# Patient Record
Sex: FEMALE | Race: WHITE | Employment: UNEMPLOYED | URBAN - NONMETROPOLITAN AREA
[De-identification: names, ages, dates, MRNs, and addresses within clinical notes are randomized per-mention and may not be internally consistent; named-entity substitution may affect disease eponyms.]

---

## 2017-07-26 ENCOUNTER — HOSPITAL ENCOUNTER (EMERGENCY)
Age: 22
Discharge: HOME | End: 2017-07-26
Payer: COMMERCIAL

## 2017-07-26 VITALS
WEIGHT: 240 LBS | HEIGHT: 67 IN | TEMPERATURE: 98.6 F | SYSTOLIC BLOOD PRESSURE: 149 MMHG | RESPIRATION RATE: 16 BRPM | HEART RATE: 84 BPM | BODY MASS INDEX: 37.67 KG/M2 | OXYGEN SATURATION: 99 % | DIASTOLIC BLOOD PRESSURE: 79 MMHG

## 2017-07-26 DIAGNOSIS — J02.9 ACUTE PHARYNGITIS, UNSPECIFIED ETIOLOGY: Primary | ICD-10-CM

## 2017-07-26 LAB
GROUP A STREP CULTURE, REFLEX: NEGATIVE
REFLEX THROAT C + S: NORMAL

## 2017-07-26 PROCEDURE — 99213 OFFICE O/P EST LOW 20 MIN: CPT | Performed by: NURSE PRACTITIONER

## 2017-07-26 PROCEDURE — 87880 STREP A ASSAY W/OPTIC: CPT

## 2017-07-26 PROCEDURE — 87070 CULTURE OTHR SPECIMN AEROBIC: CPT

## 2017-07-26 PROCEDURE — 99214 OFFICE O/P EST MOD 30 MIN: CPT

## 2017-07-26 RX ORDER — CETIRIZINE HYDROCHLORIDE 10 MG/1
10 TABLET ORAL DAILY
COMMUNITY
End: 2017-10-05 | Stop reason: HOSPADM

## 2017-07-26 ASSESSMENT — ENCOUNTER SYMPTOMS
COUGH: 1
STRIDOR: 0
TROUBLE SWALLOWING: 0
SINUS CONGESTION: 0
NAUSEA: 0
VOICE CHANGE: 0
EYE DISCHARGE: 0
SINUS PRESSURE: 0
CHEST TIGHTNESS: 0
SHORTNESS OF BREATH: 0
COLOR CHANGE: 0
VOMITING: 0
ABDOMINAL PAIN: 0
RHINORRHEA: 0
SORE THROAT: 1
WHEEZING: 0

## 2017-07-26 ASSESSMENT — PAIN SCALES - GENERAL: PAINLEVEL_OUTOF10: 4

## 2017-07-26 ASSESSMENT — PAIN DESCRIPTION - LOCATION: LOCATION: THROAT

## 2017-07-26 ASSESSMENT — PAIN DESCRIPTION - PAIN TYPE: TYPE: ACUTE PAIN

## 2017-07-26 ASSESSMENT — PAIN DESCRIPTION - DESCRIPTORS: DESCRIPTORS: SORE

## 2017-07-28 LAB — THROAT/NOSE CULTURE: NORMAL

## 2017-07-30 ENCOUNTER — HOSPITAL ENCOUNTER (EMERGENCY)
Age: 22
Discharge: HOME | End: 2017-07-30
Payer: COMMERCIAL

## 2017-07-30 VITALS
RESPIRATION RATE: 16 BRPM | HEIGHT: 67 IN | SYSTOLIC BLOOD PRESSURE: 137 MMHG | DIASTOLIC BLOOD PRESSURE: 93 MMHG | BODY MASS INDEX: 37.67 KG/M2 | HEART RATE: 89 BPM | WEIGHT: 240 LBS | OXYGEN SATURATION: 97 % | TEMPERATURE: 98.3 F

## 2017-07-30 DIAGNOSIS — J20.8 ACUTE VIRAL BRONCHITIS: Primary | ICD-10-CM

## 2017-07-30 PROCEDURE — 99213 OFFICE O/P EST LOW 20 MIN: CPT | Performed by: NURSE PRACTITIONER

## 2017-07-30 PROCEDURE — 99213 OFFICE O/P EST LOW 20 MIN: CPT

## 2017-07-30 RX ORDER — ALBUTEROL SULFATE 90 UG/1
2 AEROSOL, METERED RESPIRATORY (INHALATION) EVERY 4 HOURS PRN
Qty: 1 INHALER | Refills: 0 | Status: SHIPPED | OUTPATIENT
Start: 2017-07-30 | End: 2019-01-07 | Stop reason: SDUPTHER

## 2017-07-30 RX ORDER — GUAIFENESIN 400 MG/1
400 TABLET ORAL 4 TIMES DAILY PRN
COMMUNITY
End: 2017-10-26

## 2017-07-30 RX ORDER — IBUPROFEN 200 MG
200 TABLET ORAL EVERY 6 HOURS PRN
COMMUNITY
End: 2021-04-26 | Stop reason: ALTCHOICE

## 2017-07-30 RX ORDER — ACETAMINOPHEN 325 MG/1
650 TABLET ORAL EVERY 6 HOURS PRN
COMMUNITY
End: 2017-10-26

## 2017-07-30 RX ORDER — PREDNISONE 20 MG/1
20 TABLET ORAL 2 TIMES DAILY
Qty: 10 TABLET | Refills: 0 | Status: SHIPPED | OUTPATIENT
Start: 2017-07-30 | End: 2017-08-04

## 2017-07-30 ASSESSMENT — ENCOUNTER SYMPTOMS
TROUBLE SWALLOWING: 0
CHEST TIGHTNESS: 0
SHORTNESS OF BREATH: 0
RHINORRHEA: 1
WHEEZING: 0
SINUS PRESSURE: 0
COUGH: 1
SORE THROAT: 1

## 2017-07-30 ASSESSMENT — PAIN DESCRIPTION - DESCRIPTORS: DESCRIPTORS: ACHING

## 2017-07-30 ASSESSMENT — PAIN SCALES - GENERAL: PAINLEVEL_OUTOF10: 5

## 2017-07-30 ASSESSMENT — PAIN DESCRIPTION - LOCATION: LOCATION: CHEST

## 2017-07-30 ASSESSMENT — PAIN DESCRIPTION - PAIN TYPE: TYPE: ACUTE PAIN

## 2017-10-05 ENCOUNTER — HOSPITAL ENCOUNTER (EMERGENCY)
Age: 22
Discharge: HOME | End: 2017-10-05
Payer: COMMERCIAL

## 2017-10-05 VITALS
RESPIRATION RATE: 18 BRPM | HEART RATE: 72 BPM | BODY MASS INDEX: 38.06 KG/M2 | SYSTOLIC BLOOD PRESSURE: 158 MMHG | WEIGHT: 243 LBS | TEMPERATURE: 98.2 F | OXYGEN SATURATION: 99 % | DIASTOLIC BLOOD PRESSURE: 82 MMHG

## 2017-10-05 DIAGNOSIS — J30.2 SEASONAL ALLERGIC RHINITIS, UNSPECIFIED ALLERGIC RHINITIS TRIGGER: Primary | ICD-10-CM

## 2017-10-05 PROCEDURE — 99213 OFFICE O/P EST LOW 20 MIN: CPT | Performed by: NURSE PRACTITIONER

## 2017-10-05 PROCEDURE — 99212 OFFICE O/P EST SF 10 MIN: CPT

## 2017-10-05 RX ORDER — FLUTICASONE PROPIONATE 50 MCG
1 SPRAY, SUSPENSION (ML) NASAL DAILY
COMMUNITY
End: 2017-10-26 | Stop reason: SDUPTHER

## 2017-10-05 RX ORDER — LORATADINE 10 MG/1
10 TABLET ORAL DAILY
Qty: 15 TABLET | Refills: 0 | Status: SHIPPED | OUTPATIENT
Start: 2017-10-05 | End: 2017-10-26

## 2017-10-05 ASSESSMENT — ENCOUNTER SYMPTOMS
COUGH: 1
CHEST TIGHTNESS: 0
DIARRHEA: 0
SHORTNESS OF BREATH: 0
SORE THROAT: 0
VOMITING: 0
NAUSEA: 0
TROUBLE SWALLOWING: 0
ABDOMINAL PAIN: 0
RHINORRHEA: 0
SINUS PRESSURE: 0

## 2017-10-05 NOTE — ED AVS SNAPSHOT
After Visit Summary  (Discharge Instructions)    Medication List for Home    Based on the information you provided to us as well as any changes during this visit, the following is your updated medication list.  Compare this with your prescription bottles at home. If you have any questions or concerns, contact your primary care physician's office. Daily Medication List (This medication list can be shared with any Healthcare provider who is helping you manage your medications)      There are NEW medications for you. START taking them after you leave the hospital     loratadine 10 MG tablet   Commonly known as:  CLARITIN   Take 1 tablet by mouth daily         ASK your doctor about these medications if you have questions     acetaminophen 325 MG tablet   Commonly known as:  TYLENOL   Take 650 mg by mouth every 6 hours as needed for Pain       albuterol sulfate  (90 Base) MCG/ACT inhaler   Commonly known as:  VENTOLIN HFA   Inhale 2 puffs into the lungs every 4 hours as needed for Wheezing or Shortness of Breath       fluticasone 27.5 MCG/SPRAY nasal spray   Commonly known as:  VERAMYST   2 sprays by Nasal route daily       fluticasone 50 MCG/ACT nasal spray   Commonly known as:  FLONASE   1 spray by Nasal route daily       guaiFENesin 400 MG tablet   Take 400 mg by mouth 4 times daily as needed for Cough       ibuprofen 200 MG tablet   Commonly known as:  ADVIL;MOTRIN   Take 200 mg by mouth every 6 hours as needed for Pain       NASONEX NA   by Nasal route       simethicone 80 MG chewable tablet   Commonly known as:  MYLICON   Take 1 tablet by mouth every 6 hours as needed for Flatulence       Spacer/Aero-Holding [employer]   1 Device by Does not apply route daily as needed.          These are the medications you have told us you were taking at home, STOP taking them after you leave the hospital     cetirizine 10 MG tablet   Commonly known as:  ZYRTEC Where to Get Your Medications      You can get these medications from any pharmacy     Bring a paper prescription for each of these medications     loratadine 10 MG tablet               Allergies as of 10/5/2017     No Known Allergies      Immunizations as of 10/5/2017     No immunizations on file. After Visit Summary    This summary was created for you. Thank you for entrusting your care to us. The following information includes details about your hospital/visit stay along with steps you should take to help with your recovery once you leave the hospital.  In this packet, you will find information about the topics listed below:    · Instructions about your medications including a list of your home medications  · A summary of your hospital visit  · Follow-up appointments once you have left the hospital  · Your care plan at home      You may receive a survey regarding the care you received during your stay. Your input is valuable to us. We encourage you to complete and return your survey in the envelope provided. We hope you will choose us in the future for your healthcare needs. Patient Information     Patient Name JAIDEN Dela Cruz 1995      Care Provided at:     Name Address Phone       [address] [phone]            Your Visit    Here you will find information about your visit, including the reason for your visit. Please take this sheet with you when you visit your doctor or other health care provider in the future. It will help determine the best possible medical care for you at that time. If you have any questions once you leave the hospital, please call the department phone number listed below. Diagnoses this visit     Your diagnosis was SEASONAL ALLERGIC RHINITIS, UNSPECIFIED ALLERGIC RHINITIS TRIGGER.       Visit Information     Date of Visit Department Dept Phone [facility] [phone]      You were seen by     You were seen by Kristopher Lewis CNP. Follow-up Appointments    Below is a list of your follow-up and future appointments. This may not be a complete list as you may have made appointments directly with providers that we are not aware of or your providers may have made some for you. Please call your providers to confirm appointments. It is important to keep your appointments. Please bring your current insurance card, photo ID, co-pay, and all medication bottles to your appointment. If self-pay, payment is expected at the time of service. Follow-up Information     Follow up with Lai Manning MD.    Specialty:  Pediatrics    Why:  As needed, If symptoms worsen, GO DIRECTLY TO THE EMERGENCY DEPARTMENT    Contact information:    [place]   [address]          Follow up with [address] [facility]. Specialty:  Emergency Medicine    Why:  As needed, If symptoms worsen, GO DIRECTLY TO THE EMERGENCY DEPARTMENT    Contact information:    [address] [facility]  [phone]      Preventive Care        Date Due    Yearly Flu Vaccine (1) 9/1/2017                 Care Plan Once You Return Home    This section includes instructions you will need to follow once you leave the hospital.  Your care team will discuss these with you, so you and those caring for you know how to best care for your health needs at home. This section may also include educational information about certain health topics that may be of help to you. Important Information if you smoke or are exposed to smoking       SMOKING: QUIT SMOKING. THIS IS THE MOST IMPORTANT ACTION YOU CAN TAKE TO IMPROVE YOUR CURRENT AND FUTURE HEALTH. Call the [address] [place] [address] at [facility] NOW ([phone])    Smoking harms nonsmokers.  When nonsmokers are around people who smoke, they absorb nicotine, carbon monoxide, and other ingredients of tobacco smoke. DO NOT SMOKE AROUND CHILDREN     Children exposed to secondhand smoke are at an increased risk of:  Sudden Infant Death Syndrome (SIDS), acute respiratory infections, inflammation of the middle ear, and severe asthma. Over a longer time, it causes heart disease and lung cancer. There is no safe level of exposure to secondhand smoke. [employer] Signup     [employer] allows you to send messages to your doctor, view your test results, renew your prescriptions, schedule appointments, view visit notes, and more. How Do I Sign Up? 1. In your Internet browser, go to [email or web address]  2. Click on the Sign Up Now link in the Sign In box. You will see the New Member Sign Up page. 3. Enter your [employer] Access Code exactly as it appears below. You will not need to use this code after youve completed the sign-up process. If you do not sign up before the expiration date, you must request a new code. [employer] Access Code: [email or web address]  Expires: 12/4/2017  8:23 PM    4. Enter your Social Security Number ([id number]) and Date of Birth (mm/dd/yyyy) as indicated and click Submit. You will be taken to the next sign-up page. 5. Create a [employer] ID. This will be your [employer] login ID and cannot be changed, so think of one that is secure and easy to remember. 6. Create a [employer] password. You can change your password at any time. 7. Enter your Password Reset Question and Answer. This can be used at a later time if you forget your password. 8. Enter your e-mail address. You will receive e-mail notification when new information is available in [address] 9. Click Sign Up. You can now view your medical record. Additional Information  If you have questions, please contact the physician practice where you receive care. Remember, [employer] is NOT to be used for urgent needs.  For medical emergencies, dial 911. For questions regarding your MyChart account call [phone]. If you have a clinical question, please call your doctor's office. View your information online  ? Review your current list of  medications, immunization, and allergies. ? Review your future test results online . ? Review your discharge instructions provided by your caregivers at discharge    Certain functionality such as prescription refills, scheduling appointments or sending messages to your provider are not activated if your provider does not use [facility] in his/her office    For questions regarding your MyChart account call [phone]. If you have a clinical question, please call your doctor's office. The information on all pages of the After Visit Summary has been reviewed with me, the patient and/or responsible adult, by my health care provider(s). I had the opportunity to ask questions regarding this information. I understand I should dispose of my armband safely at home to protect my health information. A complete copy of the After Visit Summary has been given to me, the patient and/or responsible adult. Patient Signature/Responsible Adult: ___________________________________    Nurse Signature: ___________________________________  Resident/MLP Signature: ___________________________________  Attending Signature: ___________________________________    Date:____________Time:____________              Discharge Instructions            Allergies: Care Instructions  Your Care Instructions  Allergies occur when your body's defense system (immune system) overreacts to certain substances. The immune system treats a harmless substance as if it were a harmful germ or virus. Many things can cause this overreaction, including pollens, medicine, food, dust, animal dander, and mold. Allergies can be mild or severe.  Mild allergies can be managed with home ¨ Swelling. ¨ Belly pain, nausea, or vomiting. Watch closely for changes in your health, and be sure to contact your doctor if:  · You do not get better as expected. Where can you learn more? Go to [email or web address] and sign in to your [employer] account. Enter Y806 in the [employer] customer insight box to learn more about \"Allergies: Care Instructions. \"     If you do not have an account, please click on the \"Sign Up Now\" link. Current as of: April 3, 2017  Content Version: 11.3  © 0618-4901 [employer]. Care instructions adapted under license by [facility] ([facility]). If you have questions about a medical condition or this instruction, always ask your healthcare professional. [address] any warranty or liability for your use of this information.

## 2017-10-06 NOTE — ED TRIAGE NOTES
ambulatory back to exam room. Patient complains of cough, runny nose. Symptoms began 1 week ago. The cough is productive with yellow mucus. Respirations easy and unlabored. Condition stable. Waiting in room to be seen by medical provider.

## 2017-10-06 NOTE — ED PROVIDER NOTES
no frontal sinus tenderness. Left sinus exhibits no maxillary sinus tenderness and no frontal sinus tenderness. Mouth/Throat: Uvula is midline, oropharynx is clear and moist and mucous membranes are normal. No oral lesions. No uvula swelling. No oropharyngeal exudate, posterior oropharyngeal edema, posterior oropharyngeal erythema or tonsillar abscesses. Eyes: Conjunctivae are normal.   Neck: Normal range of motion and full passive range of motion without pain. Cardiovascular: Normal rate. Pulmonary/Chest: Effort normal. No accessory muscle usage. No respiratory distress. She has wheezes (a few scattered wheezes). Lymphadenopathy:        Head (right side): No submental, no submandibular, no tonsillar, no preauricular, no posterior auricular and no occipital adenopathy present. Head (left side): No submental, no submandibular, no tonsillar, no preauricular, no posterior auricular and no occipital adenopathy present. She has no cervical adenopathy. Right: Supraclavicular adenopathy present. Neurological: She is alert and oriented to person, place, and time. Skin: Skin is warm and dry. No rash (on exposed surfaces) noted. She is not diaphoretic. Psychiatric: She has a normal mood and affect. Her speech is normal.   Nursing note and vitals reviewed. DIAGNOSTIC RESULTS   Labs:No results found for this visit on 10/05/17. IMAGING:  No orders to display     URGENT CARE COURSE:     Vitals:    10/05/17 2002 10/05/17 2005   BP:  (!) 158/82   Pulse:  72   Resp:  18   Temp:  98.2 °F (36.8 °C)   TempSrc:  Temporal   SpO2:  99%   Weight: 243 lb (110.2 kg) 243 lb (110.2 kg)       Medications - No data to display  PROCEDURES:  None  FINAL IMPRESSION      1. Seasonal allergic rhinitis, unspecified allergic rhinitis trigger        DISPOSITION/PLAN   DISPOSITION Decision to Discharge   Medications as directed. No sign of infection, continue inhaler add Claritin.   Differential diagnosis(s)

## 2017-10-18 ENCOUNTER — TELEPHONE (OUTPATIENT)
Dept: FAMILY MEDICINE CLINIC | Age: 22
End: 2017-10-18

## 2017-10-18 NOTE — TELEPHONE ENCOUNTER
1. Appt time and date. (give directions)    10/26/17   2:00   Dr Saulo Mendes  Pt informed. 2.  Arrive 15 min before appt. Pt informed. 3. Please bring all medications to appt. Pt informed. 4. Bring immunization record. (if no record, which immunizations have you had and where?)  Pt informed.

## 2017-10-26 ENCOUNTER — TELEPHONE (OUTPATIENT)
Dept: FAMILY MEDICINE CLINIC | Age: 22
End: 2017-10-26

## 2017-10-26 ENCOUNTER — OFFICE VISIT (OUTPATIENT)
Dept: FAMILY MEDICINE CLINIC | Age: 22
End: 2017-10-26
Payer: COMMERCIAL

## 2017-10-26 VITALS
TEMPERATURE: 98.4 F | HEART RATE: 92 BPM | RESPIRATION RATE: 16 BRPM | HEIGHT: 66 IN | WEIGHT: 240.4 LBS | DIASTOLIC BLOOD PRESSURE: 80 MMHG | SYSTOLIC BLOOD PRESSURE: 118 MMHG | BODY MASS INDEX: 38.63 KG/M2

## 2017-10-26 DIAGNOSIS — Z23 NEED FOR INFLUENZA VACCINATION: ICD-10-CM

## 2017-10-26 DIAGNOSIS — J30.1 CHRONIC SEASONAL ALLERGIC RHINITIS DUE TO POLLEN: Primary | Chronic | ICD-10-CM

## 2017-10-26 DIAGNOSIS — E66.9 OBESITY (BMI 30-39.9): Chronic | ICD-10-CM

## 2017-10-26 DIAGNOSIS — J45.20 MILD INTERMITTENT ASTHMA WITHOUT COMPLICATION: Chronic | ICD-10-CM

## 2017-10-26 DIAGNOSIS — E28.2 PCOS (POLYCYSTIC OVARIAN SYNDROME): Chronic | ICD-10-CM

## 2017-10-26 PROCEDURE — 90688 IIV4 VACCINE SPLT 0.5 ML IM: CPT | Performed by: FAMILY MEDICINE

## 2017-10-26 PROCEDURE — G8484 FLU IMMUNIZE NO ADMIN: HCPCS | Performed by: FAMILY MEDICINE

## 2017-10-26 PROCEDURE — 90471 IMMUNIZATION ADMIN: CPT | Performed by: FAMILY MEDICINE

## 2017-10-26 PROCEDURE — G8417 CALC BMI ABV UP PARAM F/U: HCPCS | Performed by: FAMILY MEDICINE

## 2017-10-26 PROCEDURE — 99204 OFFICE O/P NEW MOD 45 MIN: CPT | Performed by: FAMILY MEDICINE

## 2017-10-26 PROCEDURE — G8427 DOCREV CUR MEDS BY ELIG CLIN: HCPCS | Performed by: FAMILY MEDICINE

## 2017-10-26 PROCEDURE — 1036F TOBACCO NON-USER: CPT | Performed by: FAMILY MEDICINE

## 2017-10-26 RX ORDER — CETIRIZINE HYDROCHLORIDE 10 MG/1
10 TABLET ORAL DAILY
Qty: 90 TABLET | Refills: 3 | Status: SHIPPED | OUTPATIENT
Start: 2017-10-26 | End: 2018-09-11 | Stop reason: ALTCHOICE

## 2017-10-26 RX ORDER — FLUTICASONE PROPIONATE 50 MCG
2 SPRAY, SUSPENSION (ML) NASAL DAILY
Qty: 3 BOTTLE | Refills: 3 | Status: SHIPPED | OUTPATIENT
Start: 2017-10-26 | End: 2018-12-06 | Stop reason: SDUPTHER

## 2017-10-26 NOTE — TELEPHONE ENCOUNTER
PFT study pre and post scheduled @ [address] [facility] [place]. 10/30/17 @ 0700 pt to arrive @ 0630   1. Light meal prior  2. No breathing meds 4-6 hr prior to test  3. Hold antihistamine 48 hr prior  4. No caffeine, smoking, alcohol day of test  5. No exercising day of test.  6. Cover mouth to prevent breathing cold air day of test.    Left detailed message on machine. Ok per signed hipaa.

## 2017-10-26 NOTE — PROGRESS NOTES
Chief Complaint   Patient presents with    New Patient     Establish care    Allergies    Asthma    Other       History obtained from the patient. SUBJECTIVE:  Reva Junior is a 23 y.o. female that presents today for establishing care with new physician, etc. [facility] patient[facility] [address]. 1.) YASH: long hx of allergies. Had been worse, but now on zytrec and flonase and very well controlled now. Tolerating well w/o side effects, no itchy eyes, runny nose or       2.) recurrent \"bronchitis\": states for years gets current bronchitis that requires an inhaler, albuiterol. Only needs when sick or allergies flare. When well, never uses. No sxs today. Never formally dx with asthma. ER several times for this. Feeling well the last few wks now.      3.) PCOS/Obesity: following with gyn for PCOS, used to be on ocp, but stopped. Did this because she wanted to see if her periods would regulate on their own. They have not. Periods regular while on OCP. She is asking what else can be done besides OCP.  She is exercising and is working on losing  wt      Age/Gender Health Maintenance    Lipid - age 30  DM Screen -   Lab Results   Component Value Date    GLUCOSE 86 04/15/2016    GLUCOSE 97 10/19/2011       Colon Cancer Screening - age 50  Lung Cancer Screening (Age 54 to [de-identified] with 27 pack year hx, current smoker or quit within past 15 years) - never smoekr    Tetanus - UTD 2009  Influenza Vaccine - UTD FALL 2017  Pneumonia Vaccine - discuss next visit  Zostavax - age 63     Breast Cancer Screening - age 44-55  Cervical Cancer Screening - age 24  Osteoporosis Screening - age 74    Falls screening - n/a      Current Outpatient Prescriptions   Medication Sig Dispense Refill    cetirizine (ZYRTEC ALLERGY) 10 MG tablet Take 1 tablet by mouth daily 90 tablet 3    fluticasone (FLONASE) 50 MCG/ACT nasal spray 2 sprays by Nasal route daily 3 Bottle 3    ibuprofen (ADVIL;MOTRIN) 200 MG tablet Take 200 mg by mouth every 6 hours as needed for Pain      albuterol sulfate HFA (VENTOLIN HFA) 108 (90 Base) MCG/ACT inhaler Inhale 2 puffs into the lungs every 4 hours as needed for Wheezing or Shortness of Breath 1 Inhaler 0    Spacer/Aero-Holding [employer] 1 Device by Does not apply route daily as needed. 1 Device 0     No current facility-administered medications for this visit. Orders Placed This Encounter   Medications    cetirizine (ZYRTEC ALLERGY) 10 MG tablet     Sig: Take 1 tablet by mouth daily     Dispense:  90 tablet     Refill:  3    fluticasone (FLONASE) 50 MCG/ACT nasal spray     Si sprays by Nasal route daily     Dispense:  3 Bottle     Refill:  3         All medications reviewed and reconciled, including OTC and herbal medications. Updated list given to patient. Patient Active Problem List    Diagnosis Date Noted    Allergic rhinitis     History of pneumonia     Mild intermittent asthma     PCOS (polycystic ovarian syndrome)     Obesity (BMI 30-39. 9)          Past Medical History:   Diagnosis Date    Allergic rhinitis     History of pneumonia     Mild intermittent asthma     Obesity (BMI 30-39. 9)     PCOS (polycystic ovarian syndrome)          Past Surgical History:   Procedure Laterality Date    TONSILLECTOMY      adnoids         No Known Allergies      Social History     Social History    Marital status: Single     Spouse name: N/A    Number of children: N/A    Years of education: N/A     Occupational History    Not on file.      Social History Main Topics    Smoking status: Never Smoker    Smokeless tobacco: Never Used    Alcohol use No    Drug use: No    Sexual activity: Not Currently     Other Topics Concern    Not on file     Social History Narrative    No narrative on file         Family History   Problem Relation Age of Onset    Diabetes Mother     High Blood Pressure Mother     High Blood Pressure Father     High Cholesterol Father     Colon Cancer Neg Hx  Breast Cancer Neg Hx          I have reviewed the patient's past medical history, past surgical history, allergies, medications, social and family history and I have made updates where appropriate. Review of Systems  Positive responses are highlighted in bold    Constitutional:  Fever, Chills, Night Sweats, Fatigue, Unexpected changes in weight  Eyes:  Eye discharge, Eye pain, Eye redness, Visual disturbances   HENT:  Ear pain, Tinnitus, Nosebleeds, Trouble swallowing, Hearing loss, Sore throat  Cardiovascular:  Chest Pain, Palpitations, Orthopnea, Paroxysmal Nocturnal Dyspnea  Respiratory:  Cough, Wheezing, Shortness of breath, Chest tightness, Apnea  Gastrointestinal:  Nausea, Vomiting, Diarrhea, Constipation, Heartburn, Blood in stool  Genitourinary:  Difficulty or painful urination, Flank pain, Change in frequency, Urgency  Skin:  Color change, Rash, Itching, Wound  Psychiatric:  Hallucinations, Anxiety, Depression, Suicidal ideation  Hematological:  Enlarged glands, Easy bleeding, Easily bruising  Musculoskeletal:  Joint pain, Back pain, Gait problems, Joint swelling, Myalgias  Neurological:  Dizziness, Headaches, Presyncope, Numbness, Seizures, Tremors  Allergy:  Environmental allergies, Food allergies  Endocrine:  Heat Intolerance, Cold Intolerance, Polydipsia, Polyphagia, Polyuria      PHYSICAL EXAM:  Vitals:    10/26/17 1410   BP: 118/80   Pulse: 92   Resp: 16   Temp: 98.4 °F (36.9 °C)   Weight: 240 lb 6.4 oz (109 kg)   Height: 5' 6\" (1.676 m)     Body mass index is 38.8 kg/m².   Pain Score:   0 - No pain    VS Reviewed  General Appearance: A&O x 3, No acute distress,well developed and well- nourished  Head: normocephalic and atraumatic  Eyes: pupils equal, round, and reactive to light, extraocular eye movements intact, conjunctivae and eye lids without erythema  ENT: external ear and ear canal clear bilaterally, TMs intact and regular, nose without deformity, nasal mucosa and turbinates normal without polyps, oropharynx normal, dentition is normal for age  Neck: supple and non-tender without mass, no thyromegaly or thyroid nodules, no cervical lymphadenopathy  Pulmonary/Chest: clear to auscultation bilaterally- no wheezes, rales or rhonchi, normal air movement, no respiratory distress or retractions  Cardiovascular: S1 and S2 auscultated w/ RRR. No murmurs, rubs, clicks, or gallops, distal pulses intact. Abdomen: soft, non-tender, non-distended, bowl sounds physiologic,  no rebound or guarding, no masses or hernias noted. Liver and spleen without enlargement. Extremities: no cyanosis, clubbing or edema of the lower extremities. +2 PT/DP bilaterally. Musculoskeletal: No joint swelling or gross deformity   Neuro:  Alert, 5/5 strength globally and symmetrically, 2+ patellar reflexes bilaterally  Psych: Affect appropriate. Mood normal. Thought process is normal without evidence of depression or psychosis. Good insight and appropriate interaction. Cognition and memory appear to be intact. Skin: warm and dry, no rash or erythema  Lymph:  No cervical, auricular or supraclavicular lymph nodes palpated        ASSESSMENT & PLAN  1. Chronic seasonal allergic rhinitis due to pollen    Well controlled at present  Con't zyrtec and flonase    - cetirizine (ZYRTEC ALLERGY) 10 MG tablet; Take 1 tablet by mouth daily  Dispense: 90 tablet; Refill: 3  - fluticasone (FLONASE) 50 MCG/ACT nasal spray; 2 sprays by Nasal route daily  Dispense: 3 Bottle; Refill: 3    2. Mild intermittent asthma without complication    Hx and exam most c/w mild intermittent dx    Reviewed this in detail today    Plan:  con't prn alb  Trigger control with allergic rhininis txt  Will get baseline PFTs  F/u 3 months    - FULL PFT STUDY WITH PRE AND POST; Future    3.  PCOS (polycystic ovarian syndrome)    Long discussion on pathophys of PCOS, how it's tied to weight and insulin resistant  Discussed the long-term consequences of anovulatory cycles and the rationale for treatment  con't weight loss and exercise  Would recommend resuming ocp and f/u with gyn as well, she plans to soon    4. Obesity (BMI 30-39.9)    con't with wt loss stategies, reassess in 3 months    5. Need for influenza vaccination    - INFLUENZA, QUADV, 3 YRS AND OLDER, IM, MDV, 0.5ML ([employer]idel[facility])      DISPOSITION    Return in about 3 months (around 1/26/2018) for f/u asthma, sooner as needed. Hieu Orellana released without restrictions. Future Appointments  Date Time Provider Stanislav Tellez   10/30/2017 7:00 AM STR PULMONARY FUNCTION ROOM 1 [facility] PFT None   1/26/2018 9:40 AM Jonnie Tang DO [facility]     PATIENT COUNSELING    Counseling was provided today regarding the following topics: Healthy eating habits, Regular exercise, substance abuse and healthy sleep habits. Hieu Orellana received counseling on the following healthy behaviors: nutrition, exercise and medication adherence    Patient given educational materials on: See Attached    I have instructed Hieu Orellana to complete a self tracking handout on Weights and instructed them to bring it with them to her next appointment. Barriers to learning and self management: none    Discussed use, benefit, and side effects of prescribed medications. Barriers to medication compliance addressed. All patient questions answered. Pt voiced understanding.        Electronically signed by Jonnie Tang DO on 10/26/2017 at 4:03 PM

## 2017-10-26 NOTE — PROGRESS NOTES
After obtaining consent, and per orders of Dr. Oneil Portillo, injection of Fluzone given in left deltoid by Erin Coop. Patient instructed to report any adverse reaction to me immediately. Most recent Vaccine Information Sheet dated 8/7/15 given to pt.

## 2017-10-26 NOTE — PROGRESS NOTES
Visit Information    Have you changed or started any medications since your last visit including any over-the-counter medicines, vitamins, or herbal medicines? no   Are you having any side effects from any of your medications? -  no  Have you stopped taking any of your medications? Is so, why? -  no    Have you seen any other physician or provider since your last visit? No  Have you had any other diagnostic tests since your last visit? No  Have you been seen in the emergency room and/or had an admission to a hospital since we last saw you? Yes - Records Obtained  Have you had your routine dental cleaning in the past 6 months? yes - 8/2017    Have you activated your iConclude account? If not, what are your barriers?  Yes     Patient Care Team:  Celso Godoy DO as PCP - General (Family Medicine)    Medical History Review  Past Medical, Family, and Social History reviewed and does contribute to the patient presenting condition    Health Maintenance   Topic Date Due    HIV screen  06/04/2012    Chlamydia screen  06/04/2013    Flu vaccine (1) 09/01/2017    DTaP/Tdap/Td vaccine (7 - Td) 09/27/2019    Meningococcal (MCV) Vaccine Age 0-22 Years  Completed

## 2017-10-26 NOTE — PATIENT INSTRUCTIONS
occur. Then you can take medicine to prevent the asthma attack or make it less severe. · See your doctor regularly. These visits will help you learn more about asthma and what you can do to control it. Your doctor will monitor your treatment to make sure the medicine is helping you. · Keep track of your asthma attacks and your treatment. After you have had an attack, write down what triggered it, what helped end it, and any concerns you have about your asthma action plan. Take your diary when you see your doctor. You can then review your asthma action plan and decide if it is working. · Do not smoke or allow others to smoke around you. Avoid smoky places. Smoking makes asthma worse. If you need help quitting, talk to your doctor about stop-smoking programs and medicines. These can increase your chances of quitting for good. · Learn what triggers an asthma attack for you, and avoid the triggers when you can. Common triggers include colds, smoke, air pollution, dust, pollen, mold, pets, cockroaches, stress, and cold air. · Avoid colds and the flu. Get a pneumococcal vaccine shot. If you have had one before, ask your doctor whether you need a second dose. Get a flu vaccine every fall. If you must be around people with colds or the flu, wash your hands often. When should you call for help? Call 911 anytime you think you may need emergency care. For example, call if:  · You have severe trouble breathing. Call your doctor now or seek immediate medical care if:  · Your symptoms do not get better after you have followed your asthma action plan. · You cough up yellow, dark brown, or bloody mucus (sputum). Watch closely for changes in your health, and be sure to contact your doctor if:  · Your coughing and wheezing get worse. · You need to use quick-relief medicine on more than 2 days a week (unless it is just for exercise). · You need help figuring out what is triggering your asthma attacks.   Where can you learn more?  Go to [email or web address]://[email or web address] org and sign in to your Climeworks account. Enter P597 in the [facility] box to learn more about \"Asthma in Adults: Care Instructions. \"     If you do not have an account, please click on the \"Sign Up Now\" link. Current as of: March 25, 2017  Content Version: 11.3  © 6209-1700 Diet4Life. Care instructions adapted under license by [facility] ([facility]). If you have questions about a medical condition or this instruction, always ask your healthcare professional. [place] [address] any warranty or liability for your use of this information. Patient Education        Learning About Asthma Triggers  What are asthma triggers? When you have asthma, certain things can make your symptoms worse. These are called triggers. Learn what triggers an asthma attack for you, and avoid the triggers when you can. Common triggers include colds, smoke, air pollution, dust, pollen, pets, stress, and cold air. How do asthma triggers affect you? Triggers can make it harder for your lungs to work as they should. They can lead to sudden breathing problems and other symptoms. When you are around a trigger, an asthma attack is more likely. If your symptoms are severe, you may need emergency treatment or have to go to the hospital for treatment. What can you do to avoid triggers? The first thing is to know your triggers. When you are having symptoms, note the things around you that might be causing them. Then look for patterns that may be triggering your symptoms. Record your triggers on a piece of paper or in an asthma diary. When you have your list of possible triggers, work with your doctor to find ways to avoid them. Avoid colds and flu. Get a pneumococcal vaccine shot. If you have had one before, ask your doctor whether you need a second dose. Get a flu vaccine every year, as soon as it's available.  If you must be around people with colds or the daughters have a higher chance of getting it too. You may have other symptoms. These include weight gain, acne, too much hair growth on the face or body, high blood pressure, and high blood sugar. Your ovaries may have cysts on them. These cysts are growths filled with fluid. Keep in mind that although you may not have regular periods, you can still get pregnant. Talk to your doctor about birth control if you do not want to get pregnant. Sometimes the hormone changes with PCOS can also make it hard for some women to get pregnant. If this is a concern, talk to your doctor about treatment for this problem. Women who have PCOS can go for months or longer with no period. Your doctor may recommend medicines that can help get your cycles back to normal.  Follow-up care is a key part of your treatment and safety. Be sure to make and go to all appointments, and call your doctor if you are having problems. It's also a good idea to know your test results and keep a list of the medicines you take. How can you care for yourself at home? · Take your medicines exactly as prescribed. Call your doctor if you think you are having a problem with your medicine. · Eat a healthy diet. Include fruits, vegetables, beans, and whole grains in your diet each day. · If you are overweight, losing weight can help with many of the symptoms of PCOS. Talk to your doctor about safe ways to lose weight. · Get at least 30 minutes of exercise on most days of the week. Walking is a good choice. Or you can run, swim, cycle, or play tennis or team sports. · For hair growth you don't want, try bleaching, plucking, electrolysis, or laser therapy. · Acne can be treated with over-the-counter medicines. Look for ones that have benzoyl peroxide or salicylic acid in them. When should you call for help? Call your doctor now or seek immediate medical care if:  · You have severe vaginal bleeding.  This means that you are soaking through your usual pads or tampons each hour for 2 or more hours. Watch closely for changes in your health, and be sure to contact your doctor if:  · You have more vaginal bleeding, or bleeding is more irregular. Where can you learn more? Go to https://chpeblaireb.[email or web address] org and sign in to your Magnasense account. Enter S822 in the ShopWell box to learn more about \"Polycystic Ovary Syndrome: Care Instructions. \"     If you do not have an account, please click on the \"Sign Up Now\" link. Current as of: October 13, 2016  Content Version: 11.3  © 0790-0228 Hactus, Incorporated. Care instructions adapted under license by [facility] ([facility]). If you have questions about a medical condition or this instruction, always ask your healthcare professional. [address] any warranty or liability for your use of this information.

## 2017-10-30 ENCOUNTER — HOSPITAL ENCOUNTER (OUTPATIENT)
Dept: PULMONOLOGY | Age: 22
Discharge: HOME | End: 2017-10-30
Payer: COMMERCIAL

## 2017-10-30 DIAGNOSIS — J45.20 MILD INTERMITTENT ASTHMA WITHOUT COMPLICATION: Chronic | ICD-10-CM

## 2017-10-30 PROCEDURE — 94726 PLETHYSMOGRAPHY LUNG VOLUMES: CPT

## 2017-10-30 PROCEDURE — 94060 EVALUATION OF WHEEZING: CPT

## 2017-10-30 PROCEDURE — 94729 DIFFUSING CAPACITY: CPT

## 2017-11-02 ENCOUNTER — TELEPHONE (OUTPATIENT)
Dept: FAMILY MEDICINE CLINIC | Age: 22
End: 2017-11-02

## 2017-11-02 NOTE — TELEPHONE ENCOUNTER
Leta Duggan is requesting a call with her PFT results, done today at [facility], and ordered by john. Please call her at [phone].

## 2017-12-21 ENCOUNTER — OFFICE VISIT (OUTPATIENT)
Dept: FAMILY MEDICINE CLINIC | Age: 22
End: 2017-12-21
Payer: COMMERCIAL

## 2017-12-21 VITALS
HEIGHT: 67 IN | BODY MASS INDEX: 38.64 KG/M2 | DIASTOLIC BLOOD PRESSURE: 86 MMHG | RESPIRATION RATE: 20 BRPM | SYSTOLIC BLOOD PRESSURE: 128 MMHG | TEMPERATURE: 98.3 F | WEIGHT: 246.2 LBS | HEART RATE: 86 BPM

## 2017-12-21 DIAGNOSIS — L02.416 ABSCESS OF LEFT LEG: Primary | ICD-10-CM

## 2017-12-21 PROCEDURE — G8484 FLU IMMUNIZE NO ADMIN: HCPCS | Performed by: FAMILY MEDICINE

## 2017-12-21 PROCEDURE — 99213 OFFICE O/P EST LOW 20 MIN: CPT | Performed by: FAMILY MEDICINE

## 2017-12-21 PROCEDURE — G8427 DOCREV CUR MEDS BY ELIG CLIN: HCPCS | Performed by: FAMILY MEDICINE

## 2017-12-21 PROCEDURE — 1036F TOBACCO NON-USER: CPT | Performed by: FAMILY MEDICINE

## 2017-12-21 PROCEDURE — G8417 CALC BMI ABV UP PARAM F/U: HCPCS | Performed by: FAMILY MEDICINE

## 2017-12-21 RX ORDER — SULFAMETHOXAZOLE AND TRIMETHOPRIM 800; 160 MG/1; MG/1
1 TABLET ORAL 2 TIMES DAILY
Qty: 20 TABLET | Refills: 0 | Status: SHIPPED | OUTPATIENT
Start: 2017-12-21 | End: 2017-12-31

## 2017-12-21 RX ORDER — MUPIROCIN CALCIUM 20 MG/G
CREAM TOPICAL
Qty: 1 TUBE | Refills: 0 | Status: SHIPPED | OUTPATIENT
Start: 2017-12-21 | End: 2018-01-20

## 2017-12-21 ASSESSMENT — PATIENT HEALTH QUESTIONNAIRE - PHQ9
SUM OF ALL RESPONSES TO PHQ QUESTIONS 1-9: 0
SUM OF ALL RESPONSES TO PHQ9 QUESTIONS 1 & 2: 0
2. FEELING DOWN, DEPRESSED OR HOPELESS: 0
1. LITTLE INTEREST OR PLEASURE IN DOING THINGS: 0

## 2017-12-21 NOTE — PROGRESS NOTES
Visit Information    Have you changed or started any medications since your last visit including any over-the-counter medicines, vitamins, or herbal medicines? no   Are you having any side effects from any of your medications? -  no  Have you stopped taking any of your medications? Is so, why? -  no    Have you seen any other physician or provider since your last visit? Yes - Records Obtained  Have you had any other diagnostic tests since your last visit? No  Have you been seen in the emergency room and/or had an admission to a hospital since we last saw you? No  Have you had your routine dental cleaning in the past 6 months? no    Have you activated your Atox Bio account? If not, what are your barriers?  yes     Patient Care Team:  Jennifer Metzger DO as PCP - General (Family Medicine)    Medical History Review  Past Medical, Family, and Social History reviewed and does not contribute to the patient presenting condition    Health Maintenance   Topic Date Due    HIV screen  06/04/2012    Chlamydia screen  06/04/2013    Pneumococcal med risk (1 of 1 - PPSV23) 06/04/2016    DTaP/Tdap/Td vaccine (7 - Td) 09/27/2019    Meningococcal (MCV) Vaccine Age 0-22 Years  Completed    Flu vaccine  Completed
Chills, Night Sweats, Fatigue, Unexpected changes in weight  HENT:  Ear pain, Tinnitus, Nosebleeds, Trouble swallowing, Hearing loss, Sore throat  Cardiovascular:  Chest Pain, Palpitations, Orthopnea, Paroxysmal Nocturnal Dyspnea  Respiratory:  Cough, Wheezing, Shortness of breath, Chest tightness, Apnea  Gastrointestinal:  Nausea, Vomiting, Diarrhea, Constipation, Heartburn, Blood in stool  Genitourinary:  Difficulty or painful urination, Flank pain, Change in frequency, Urgency  Skin:  Color change, Rash, Itching, Wound  Musculoskeletal:  Joint pain, Back pain, Gait problems, Joint swelling, Myalgias  Neurological:  Dizziness, Headaches, Presyncope, Numbness, Seizures, Tremors  Endocrine:  Heat Intolerance, Cold Intolerance, Polydipsia, Polyphagia, Polyuria      PHYSICAL EXAM:  Vitals:    12/21/17 1610   BP: 128/86   Pulse: 86   Resp: 20   Temp: 98.3 °F (36.8 °C)   Weight: 246 lb 3.2 oz (111.7 kg)   Height: 5' 6.6\" (1.692 m)     Body mass index is 39.02 kg/m². Pain Score:   2 (L thigh)    VS Reviewed  General Appearance: A&O x 3, No acute distress,well developed and well- nourished  Eyes: pupils equal, round, and reactive to light, extraocular eye movements intact, conjunctivae and eye lids without erythema  ENT: external ear and ear canal clear bilaterally, TMs intact and regular, nose without deformity, nasal mucosa and turbinates normal without polyps, oropharynx normal, dentition is normal for age  Neck: supple and non-tender without mass, no thyromegaly or thyroid nodules, no cervical lymphadenopathy  Pulmonary/Chest: clear to auscultation bilaterally- no wheezes, rales or rhonchi, normal air movement, no respiratory distress or retractions  Cardiovascular: S1 and S2 auscultated w/ RRR. No murmurs, rubs, clicks, or gallops, distal pulses intact. Abdomen: soft, non-tender, non-distended, bowl sounds physiologic,  no rebound or guarding, no masses or hernias noted. Liver and spleen without enlargement.

## 2017-12-21 NOTE — PATIENT INSTRUCTIONS
pull out all of the gauze when your doctor tells you to. ¨ After the gauze is removed, soak the area in warm water for 15 to 20 minutes 2 times a day, until the wound closes. When should you call for help? Call your doctor now or seek immediate medical care if:  ? · You have signs of worsening infection, such as:  ¨ Increased pain, swelling, warmth, or redness. ¨ Red streaks leading from the infected skin. ¨ Pus draining from the wound. ¨ A fever. ? Watch closely for changes in your health, and be sure to contact your doctor if:  ? · You do not get better as expected. Where can you learn more? Go to https://Integrien.[email or web address] org and sign in to your Hive guard unlimited account. Enter T454 in the DivvyDown box to learn more about \"Skin Abscess: Care Instructions. \"     If you do not have an account, please click on the \"Sign Up Now\" link. Current as of: October 13, 2016  Content Version: 11.4  © 4434-0093 Healthwise, Incorporated. Care instructions adapted under license by [facility] ([facility]). If you have questions about a medical condition or this instruction, always ask your healthcare professional. [place] [address] any warranty or liability for your use of this information.

## 2018-01-26 ENCOUNTER — OFFICE VISIT (OUTPATIENT)
Dept: FAMILY MEDICINE CLINIC | Age: 23
End: 2018-01-26
Payer: COMMERCIAL

## 2018-01-26 VITALS
HEIGHT: 66 IN | RESPIRATION RATE: 12 BRPM | SYSTOLIC BLOOD PRESSURE: 128 MMHG | BODY MASS INDEX: 39.66 KG/M2 | HEART RATE: 88 BPM | TEMPERATURE: 97.9 F | WEIGHT: 246.8 LBS | DIASTOLIC BLOOD PRESSURE: 78 MMHG

## 2018-01-26 DIAGNOSIS — Z23 NEED FOR PNEUMOCOCCAL VACCINATION: ICD-10-CM

## 2018-01-26 DIAGNOSIS — A08.4 VIRAL GASTROENTERITIS: ICD-10-CM

## 2018-01-26 DIAGNOSIS — J45.20 MILD INTERMITTENT ASTHMA WITHOUT COMPLICATION: Primary | Chronic | ICD-10-CM

## 2018-01-26 DIAGNOSIS — L30.9 DERMATITIS: ICD-10-CM

## 2018-01-26 PROCEDURE — 90732 PPSV23 VACC 2 YRS+ SUBQ/IM: CPT | Performed by: FAMILY MEDICINE

## 2018-01-26 PROCEDURE — G8427 DOCREV CUR MEDS BY ELIG CLIN: HCPCS | Performed by: FAMILY MEDICINE

## 2018-01-26 PROCEDURE — 90471 IMMUNIZATION ADMIN: CPT | Performed by: FAMILY MEDICINE

## 2018-01-26 PROCEDURE — 99214 OFFICE O/P EST MOD 30 MIN: CPT | Performed by: FAMILY MEDICINE

## 2018-01-26 PROCEDURE — 1036F TOBACCO NON-USER: CPT | Performed by: FAMILY MEDICINE

## 2018-01-26 PROCEDURE — G8417 CALC BMI ABV UP PARAM F/U: HCPCS | Performed by: FAMILY MEDICINE

## 2018-01-26 PROCEDURE — G8484 FLU IMMUNIZE NO ADMIN: HCPCS | Performed by: FAMILY MEDICINE

## 2018-01-26 RX ORDER — TRIAMCINOLONE ACETONIDE 1 MG/G
CREAM TOPICAL
Qty: 60 G | Refills: 1 | Status: SHIPPED | OUTPATIENT
Start: 2018-01-26 | End: 2018-06-09 | Stop reason: ALTCHOICE

## 2018-01-26 NOTE — PROGRESS NOTES
Chief Complaint   Patient presents with    3 Month Follow-Up     asthma    Nausea & Vomiting     & Diarrhea    Rash     inside of left elbow, red itchy started last sat       History obtained from the patient. SUBJECTIVE:  Vincent Mahoney is a 23 y.o. female that presents today for     -01. recurrent \"bronchitis\" LAST VISIT: states for years gets current bronchitis that requires an inhaler, albuiterol. Only needs when sick or allergies flare. When well, never uses. No sxs today. Never formally dx with asthma. ER several times for this. Feeling well the last few wks now.     UPDATE TODAY: doing well. Had PFTS done, showed mild asthma. Had URI and inc wheezing, but that since gone. avg wk, doesn't need alb. Doesn't wake at night.        -02. N/V/D: last Sunday, started with N/V and diarrhea. Getting better. Mild occ nausea. Diarrhea is gone. She's asking if she can go to [facility] on Sunday if otherwise feeling better. No fevers.       -03. Rash: red rash, itchy, on crease of L arm. No new soaps or detergents. Inciting events or exposures prior to rash starting? No  Pruritic? Yes  Erythematous? Yes  Weeping or drainage? No  History of Urticaria? No  Fever? No  Painful? No  New Detergent?   No      Age/Gender Health Maintenance    Lipid - age 30  DM Screen -   Lab Results   Component Value Date    GLUCOSE 86 04/15/2016    GLUCOSE 97 10/19/2011       Colon Cancer Screening - age 48  Lung Cancer Screening (Age 54 to [de-identified] with 27 pack year hx, current smoker or quit within past 15 years) - never smoker    Tetanus - UTD 2009  Influenza Vaccine - UTD FALL 2017  Pneumonia Vaccine - UTD PPV 23 JAN 2018  Zostavax - age 61     Breast Cancer Screening - age 44-55  Cervical Cancer Screening - age 24  Osteoporosis Screening - age 72    Falls screening - n/a      Current Outpatient Prescriptions   Medication Sig Dispense Refill    Norgestimate-Eth Estradiol (SPRINTEC 28 PO) Take by mouth      triamcinolone (KENALOG) 0.1 % cream Apply topically 2 times daily for up to 4 weeks, then weekends only as needed. 60 g 1    cetirizine (ZYRTEC ALLERGY) 10 MG tablet Take 1 tablet by mouth daily 90 tablet 3    fluticasone (FLONASE) 50 MCG/ACT nasal spray 2 sprays by Nasal route daily 3 Bottle 3    ibuprofen (ADVIL;MOTRIN) 200 MG tablet Take 200 mg by mouth every 6 hours as needed for Pain      albuterol sulfate HFA (VENTOLIN HFA) 108 (90 Base) MCG/ACT inhaler Inhale 2 puffs into the lungs every 4 hours as needed for Wheezing or Shortness of Breath 1 Inhaler 0    Spacer/Aero-Holding Chambers ROCIO 1 Device by Does not apply route daily as needed. 1 Device 0     No current facility-administered medications for this visit. Orders Placed This Encounter   Medications    triamcinolone (KENALOG) 0.1 % cream     Sig: Apply topically 2 times daily for up to 4 weeks, then weekends only as needed. Dispense:  60 g     Refill:  1         All medications reviewed and reconciled, including OTC and herbal medications. Updated list given to patient. Patient Active Problem List    Diagnosis Date Noted    Allergic rhinitis     History of pneumonia     Mild intermittent asthma     PCOS (polycystic ovarian syndrome)     Obesity (BMI 30-39. 9)          Past Medical History:   Diagnosis Date    Allergic rhinitis     History of pneumonia     Mild intermittent asthma     Obesity (BMI 30-39. 9)     PCOS (polycystic ovarian syndrome)          Past Surgical History:   Procedure Laterality Date    TONSILLECTOMY      adnoids         No Known Allergies      Social History     Social History    Marital status: Single     Spouse name: N/A    Number of children: N/A    Years of education: N/A     Occupational History    Not on file.      Social History Main Topics    Smoking status: Never Smoker    Smokeless tobacco: Never Used    Alcohol use No    Drug use: No    Sexual activity: Not Currently     Other Topics Concern    Not on file Social History Narrative    No narrative on file         Family History   Problem Relation Age of Onset    Diabetes Mother     High Blood Pressure Mother     High Blood Pressure Father     High Cholesterol Father     Colon Cancer Neg Hx     Breast Cancer Neg Hx          I have reviewed the patient's past medical history, past surgical history, allergies, medications, social and family history and I have made updates where appropriate. Review of Systems  Positive responses are highlighted in bold    Constitutional:  Fever, Chills, Night Sweats, Fatigue, Unexpected changes in weight  HENT:  Ear pain, Tinnitus, Nosebleeds, Trouble swallowing, Hearing loss, Sore throat  Cardiovascular:  Chest Pain, Palpitations, Orthopnea, Paroxysmal Nocturnal Dyspnea  Respiratory:  Cough, Wheezing, Shortness of breath, Chest tightness, Apnea  Gastrointestinal:  Nausea, Vomiting, Diarrhea, Constipation, Heartburn, Blood in stool  Genitourinary:  Difficulty or painful urination, Flank pain, Change in frequency, Urgency  Skin:  Color change, Rash, Itching, Wound  Musculoskeletal:  Joint pain, Back pain, Gait problems, Joint swelling, Myalgias  Neurological:  Dizziness, Headaches, Presyncope, Numbness, Seizures, Tremors  Endocrine:  Heat Intolerance, Cold Intolerance, Polydipsia, Polyphagia, Polyuria      PHYSICAL EXAM:  Vitals:    01/26/18 0946   BP: 128/78   Pulse: 88   Resp: 12   Temp: 97.9 °F (36.6 °C)   TempSrc: Oral   Weight: 246 lb 12.8 oz (111.9 kg)   Height: 5' 6\" (1.676 m)     Body mass index is 39.83 kg/m².   Pain Score:   0 - No pain    VS Reviewed  General Appearance: A&O x 3, No acute distress,well developed and well- nourished  Eyes: pupils equal, round, and reactive to light, extraocular eye movements intact, conjunctivae and eye lids without erythema  ENT: external ear and ear canal clear bilaterally, TMs intact and regular, nose without deformity, nasal mucosa and turbinates normal without polyps, oropharynx normal, dentition is normal for age  Neck: supple and non-tender without mass, no thyromegaly or thyroid nodules, no cervical lymphadenopathy  Pulmonary/Chest: clear to auscultation bilaterally- no wheezes, rales or rhonchi, normal air movement, no respiratory distress or retractions  Cardiovascular: S1 and S2 auscultated w/ RRR. No murmurs, rubs, clicks, or gallops, distal pulses intact. Abdomen: soft, non-tender, non-distended, bowl sounds physiologic,  no rebound or guarding, no masses or hernias noted. Liver and spleen without enlargement. Extremities: no cyanosis, clubbing or edema of the lower extremities. Skin: warm and dry, erythematous squamopapular rash on crease of L anterior arm)      ASSESSMENT & PLAN  1. Mild intermittent asthma without complication    PFTs c/w asthma, as is hx. Most c/w mild intermittent asthma  con't trigger control  AAP reviewed  Pneumovax updated  F/u annually and prn    2. Viral gastroenteritis    Appears improved  Should be ok to go to [facility] Sunday. 3. Dermatitis    F/u if no better    - triamcinolone (KENALOG) 0.1 % cream; Apply topically 2 times daily for up to 4 weeks, then weekends only as needed. Dispense: 60 g; Refill: 1    4. Need for pneumococcal vaccination    - Pneumococcal polysaccharide vaccine 23-valent greater than or equal to 1yo subcutaneous/IM      DISPOSITION    Return in about 1 year (around 1/26/2019) for f/u asthma, sooner as needed. Stefania Birmingham released without restrictions. Future Appointments  Date Time Provider Stanislav Tellez   1/28/2019 10:00 AM Wing Marta DO [facility] [address]    Patient given educational materials on: See Attached    Barriers to learning and self management: none    Discussed use, benefit, and side effects of prescribed medications. Barriers to medication compliance addressed. All patient questions answered. Pt voiced understanding.        Electronically signed by Annabel Francois

## 2018-01-26 NOTE — PROGRESS NOTES
Visit Information    Have you changed or started any medications since your last visit including any over-the-counter medicines, vitamins, or herbal medicines? no   Are you having any side effects from any of your medications? -  no  Have you stopped taking any of your medications? Is so, why? -  no    Have you seen any other physician or provider since your last visit? Dr Chauncey Hargrove  Have you had any other diagnostic tests since your last visit? No  Have you been seen in the emergency room and/or had an admission to a hospital since we last saw you? No  Have you had your routine dental cleaning in the past 6 months? no    Have you activated your Ek[employer] Bionics account? If not, what are your barriers?  Yes     Patient Care Team:  Rito Pimentel DO as PCP - General (Family Medicine)    Medical History Review  Deferred to PCP    Health Maintenance   Topic Date Due    HIV screen  06/04/2012    Chlamydia screen  06/04/2013    Pneumococcal med risk (1 of 1 - PPSV23) 06/04/2016    DTaP/Tdap/Td vaccine (7 - Td) 09/27/2019    Meningococcal (MCV) Vaccine Age 0-22 Years  Completed    Flu vaccine  Completed

## 2018-01-26 NOTE — PATIENT INSTRUCTIONS
flu, wash your hands often. When should you call for help? Call 911 anytime you think you may need emergency care. For example, call if:  ? · You have severe trouble breathing. ?Call your doctor now or seek immediate medical care if:  ? · Your symptoms do not get better after you have followed your asthma action plan. ? · You cough up yellow, dark brown, or bloody mucus (sputum). ? Watch closely for changes in your health, and be sure to contact your doctor if:  ? · Your coughing and wheezing get worse. ? · You need to use quick-relief medicine on more than 2 days a week (unless it is just for exercise). ? · You need help figuring out what is triggering your asthma attacks. Where can you learn more? Go to https://GoInstant.REAC Fuel. org and sign in to your c6 Software Corporation account. Enter P597 in the Travelkhana.com box to learn more about \"Asthma in Adults: Care Instructions. \"     If you do not have an account, please click on the \"Sign Up Now\" link. Current as of: May 12, 2017  Content Version: 11.5  © 3096-8612 Healthwise, Incorporated. Care instructions adapted under license by [facility] "[employer] [facility] ([facility]). If you have questions about a medical condition or this instruction, always ask your healthcare professional. [address] any warranty or liability for your use of this information.

## 2018-04-17 ENCOUNTER — OFFICE VISIT (OUTPATIENT)
Dept: FAMILY MEDICINE CLINIC | Age: 23
End: 2018-04-17
Payer: COMMERCIAL

## 2018-04-17 VITALS
HEART RATE: 68 BPM | RESPIRATION RATE: 18 BRPM | WEIGHT: 245 LBS | SYSTOLIC BLOOD PRESSURE: 126 MMHG | HEIGHT: 66 IN | DIASTOLIC BLOOD PRESSURE: 74 MMHG | BODY MASS INDEX: 39.37 KG/M2 | TEMPERATURE: 98.5 F

## 2018-04-17 DIAGNOSIS — J01.90 ACUTE RHINOSINUSITIS: Primary | ICD-10-CM

## 2018-04-17 PROCEDURE — G8417 CALC BMI ABV UP PARAM F/U: HCPCS | Performed by: FAMILY MEDICINE

## 2018-04-17 PROCEDURE — 1036F TOBACCO NON-USER: CPT | Performed by: FAMILY MEDICINE

## 2018-04-17 PROCEDURE — 99213 OFFICE O/P EST LOW 20 MIN: CPT | Performed by: FAMILY MEDICINE

## 2018-04-17 PROCEDURE — G8427 DOCREV CUR MEDS BY ELIG CLIN: HCPCS | Performed by: FAMILY MEDICINE

## 2018-04-17 RX ORDER — AMOXICILLIN 500 MG/1
CAPSULE ORAL
COMMUNITY
Start: 2018-04-10 | End: 2018-04-17

## 2018-04-17 RX ORDER — AMOXICILLIN AND CLAVULANATE POTASSIUM 875; 125 MG/1; MG/1
1 TABLET, FILM COATED ORAL 2 TIMES DAILY
Qty: 20 TABLET | Refills: 0 | Status: SHIPPED | OUTPATIENT
Start: 2018-04-17 | End: 2018-04-27

## 2018-06-09 ENCOUNTER — HOSPITAL ENCOUNTER (EMERGENCY)
Age: 23
Discharge: HOME | End: 2018-06-09
Payer: COMMERCIAL

## 2018-06-09 VITALS
OXYGEN SATURATION: 100 % | WEIGHT: 230 LBS | SYSTOLIC BLOOD PRESSURE: 131 MMHG | RESPIRATION RATE: 16 BRPM | DIASTOLIC BLOOD PRESSURE: 82 MMHG | TEMPERATURE: 98.5 F | HEART RATE: 97 BPM | BODY MASS INDEX: 37.14 KG/M2

## 2018-06-09 DIAGNOSIS — J01.00 ACUTE NON-RECURRENT MAXILLARY SINUSITIS: Primary | ICD-10-CM

## 2018-06-09 PROCEDURE — 99212 OFFICE O/P EST SF 10 MIN: CPT

## 2018-06-09 PROCEDURE — 99213 OFFICE O/P EST LOW 20 MIN: CPT | Performed by: NURSE PRACTITIONER

## 2018-06-09 RX ORDER — DOXYCYCLINE HYCLATE 100 MG
100 TABLET ORAL 2 TIMES DAILY
Qty: 20 TABLET | Refills: 0 | Status: SHIPPED | OUTPATIENT
Start: 2018-06-09 | End: 2018-06-19

## 2018-06-09 RX ORDER — DOXYCYCLINE HYCLATE 100 MG
100 TABLET ORAL 2 TIMES DAILY
Qty: 20 TABLET | Refills: 0 | Status: SHIPPED | OUTPATIENT
Start: 2018-06-09 | End: 2018-06-09

## 2018-06-09 ASSESSMENT — ENCOUNTER SYMPTOMS
ABDOMINAL PAIN: 0
CONSTIPATION: 0
WHEEZING: 0
BACK PAIN: 0
VOMITING: 0
SINUS PAIN: 1
RHINORRHEA: 0
APNEA: 0
SORE THROAT: 0
NAUSEA: 0
EYE DISCHARGE: 1
PHOTOPHOBIA: 0
COUGH: 1
SHORTNESS OF BREATH: 0
DIARRHEA: 0
SINUS PRESSURE: 1

## 2018-09-11 ENCOUNTER — OFFICE VISIT (OUTPATIENT)
Dept: FAMILY MEDICINE CLINIC | Age: 23
End: 2018-09-11
Payer: COMMERCIAL

## 2018-09-11 VITALS
SYSTOLIC BLOOD PRESSURE: 118 MMHG | DIASTOLIC BLOOD PRESSURE: 64 MMHG | TEMPERATURE: 98 F | BODY MASS INDEX: 38.89 KG/M2 | HEART RATE: 88 BPM | HEIGHT: 66 IN | RESPIRATION RATE: 16 BRPM | WEIGHT: 242 LBS

## 2018-09-11 DIAGNOSIS — E66.9 OBESITY (BMI 30-39.9): Chronic | ICD-10-CM

## 2018-09-11 DIAGNOSIS — J06.9 ACUTE UPPER RESPIRATORY INFECTION: Primary | ICD-10-CM

## 2018-09-11 DIAGNOSIS — J30.1 NON-SEASONAL ALLERGIC RHINITIS DUE TO POLLEN: Chronic | ICD-10-CM

## 2018-09-11 PROCEDURE — 1036F TOBACCO NON-USER: CPT | Performed by: FAMILY MEDICINE

## 2018-09-11 PROCEDURE — G8417 CALC BMI ABV UP PARAM F/U: HCPCS | Performed by: FAMILY MEDICINE

## 2018-09-11 PROCEDURE — 99214 OFFICE O/P EST MOD 30 MIN: CPT | Performed by: FAMILY MEDICINE

## 2018-09-11 PROCEDURE — G8427 DOCREV CUR MEDS BY ELIG CLIN: HCPCS | Performed by: FAMILY MEDICINE

## 2018-09-11 RX ORDER — BENZONATATE 100 MG/1
CAPSULE ORAL
Qty: 60 CAPSULE | Refills: 0 | Status: SHIPPED | OUTPATIENT
Start: 2018-09-11 | End: 2018-09-21

## 2018-09-11 RX ORDER — AMOXICILLIN 875 MG/1
875 TABLET, COATED ORAL 2 TIMES DAILY
Qty: 20 TABLET | Refills: 0 | Status: SHIPPED | OUTPATIENT
Start: 2018-09-11 | End: 2018-09-21

## 2018-09-11 RX ORDER — FEXOFENADINE HCL 180 MG/1
180 TABLET ORAL DAILY
Qty: 90 TABLET | Refills: 3 | Status: SHIPPED | OUTPATIENT
Start: 2018-09-11 | End: 2018-12-06 | Stop reason: SDUPTHER

## 2018-09-11 NOTE — PROGRESS NOTES
Chief Complaint   Patient presents with    Sinus Problem     started yesterday     Otalgia     started  yesterday     Pharyngitis     started  sunday     Allergies    Obesity       History obtained from the patient. SUBJECTIVE:  Byron Salgado is a 26 y.o. female that presents today for       -1. URI type sxs: 1 to 2 days of runny nose, cough and congestion. Some sore throat. Mild cough. Fever - No  Runny nose or congestion -  Yes   Cough -  Yes  Sore throat -  Yes  Headache, fatigue, joint pains, muscle aches -  Yes  Double Sickening - No  Shortness of breath/Wheezing? -  No  Nausea/Vomiting/Diarrhea? No  Sick contacts - No  Maxillary Tooth Pain -  No  Treatment tried and response - none      -02. Allergies: on zyrtec and flonase. Feels zyrtec not helping as much as it used to. Inc itchy eyes and sneezing. Nasal sxs controlled with flonase. States claritin doesn't help. Never on allegra.        -03. Obesity: wts up, diet not great. Not exercising. Is planning on resuming wt loss program this wk. Wt Readings from Last 3 Encounters:   09/11/18 242 lb (109.8 kg)   06/09/18 230 lb (104.3 kg)   04/17/18 245 lb (111.1 kg)       Age/Gender Health Maintenance    Lipid - age 30  DM Screen -   Lab Results   Component Value Date    GLUCOSE 86 04/15/2016    GLUCOSE 97 10/19/2011       Colon Cancer Screening - age 50  Lung Cancer Screening (Age 54 to [de-identified] with 27 pack year hx, current smoker or quit within past 15 years) - never smoker    Tetanus - UTD 2009  Influenza Vaccine - UTD FALL 2017  Pneumonia Vaccine - UTD PPV 23 JAN 2018  Zostavax - age 63     Breast Cancer Screening - age 44-55  Cervical Cancer Screening - age 24  Osteoporosis Screening - age 72    Falls screening - n/a      Current Outpatient Prescriptions   Medication Sig Dispense Refill    benzonatate (TESSALON PERLES) 100 MG capsule Take 1 to 2 tablets by mouth every 8 hours as needed for cough.  60 capsule 0    amoxicillin (AMOXIL) 875 MG tablet Take 1 tablet by mouth 2 times daily for 10 days 20 tablet 0    fexofenadine (ALLEGRA) 180 MG tablet Take 1 tablet by mouth daily 90 tablet 3    Norgestimate-Eth Estradiol (SPRINTEC 28 PO) Take by mouth      fluticasone (FLONASE) 50 MCG/ACT nasal spray 2 sprays by Nasal route daily 3 Bottle 3    ibuprofen (ADVIL;MOTRIN) 200 MG tablet Take 200 mg by mouth every 6 hours as needed for Pain      albuterol sulfate HFA (VENTOLIN HFA) 108 (90 Base) MCG/ACT inhaler Inhale 2 puffs into the lungs every 4 hours as needed for Wheezing or Shortness of Breath 1 Inhaler 0    Spacer/Aero-Holding Chambers ROCIO 1 Device by Does not apply route daily as needed. 1 Device 0     No current facility-administered medications for this visit. Orders Placed This Encounter   Medications    benzonatate (TESSALON PERLES) 100 MG capsule     Sig: Take 1 to 2 tablets by mouth every 8 hours as needed for cough. Dispense:  60 capsule     Refill:  0    amoxicillin (AMOXIL) 875 MG tablet     Sig: Take 1 tablet by mouth 2 times daily for 10 days     Dispense:  20 tablet     Refill:  0    fexofenadine (ALLEGRA) 180 MG tablet     Sig: Take 1 tablet by mouth daily     Dispense:  90 tablet     Refill:  3         All medications reviewed and reconciled, including OTC and herbal medications. Updated list given to patient. Patient Active Problem List    Diagnosis Date Noted    Allergic rhinitis     History of pneumonia     Mild intermittent asthma     PCOS (polycystic ovarian syndrome)     Obesity (BMI 30-39. 9)          Past Medical History:   Diagnosis Date    Allergic rhinitis     History of pneumonia     Mild intermittent asthma     Obesity (BMI 30-39. 9)     PCOS (polycystic ovarian syndrome)          Past Surgical History:   Procedure Laterality Date    TONSILLECTOMY      adnoids    WISDOM TOOTH EXTRACTION           No Known Allergies      Social History     Social History    Marital status: Single     Spouse 230 lb (104.3 kg)   04/17/18 245 lb (111.1 kg)     VS Reviewed  General Appearance: A&O x 3, No acute distress,well developed and well- nourished  Eyes: pupils equal, round, and reactive to light, extraocular eye movements intact, conjunctivae and eye lids without erythema  ENT: bilateral TM normal without fluid or infection, neck without nodes, throat normal without erythema or exudate, sinuses nontender, post nasal drip noted, nasal mucosa congested and Oropharynx clear, without erythema, exudate, or thrush. Neck: supple and non-tender without mass, no thyromegaly or thyroid nodules, no cervical lymphadenopathy  Pulmonary/Chest: clear to auscultation bilaterally- no wheezes, rales or rhonchi, normal air movement, no respiratory distress or retractions  Cardiovascular: S1 and S2 auscultated w/ RRR. No murmurs, rubs, clicks, or gallops, distal pulses intact. Abdomen: soft, non-tender, non-distended, bowl sounds physiologic,  no rebound or guarding, no masses or hernias noted. Liver and spleen without enlargement. Extremities: no cyanosis, clubbing or edema of the lower extremities. Skin: warm and dry, erythematous squamopapular rash on crease of L anterior arm)      ASSESSMENT & PLAN  1. Acute upper respiratory infection    Likely viral  Will have her con't flonase and add tessalon  Will do [facility] rx for amoxil and fill late this wk or early next wk if no better  F/u if no better  Reviewed ER precautions, pt understands. - benzonatate (TESSALON PERLES) 100 MG capsule; Take 1 to 2 tablets by mouth every 8 hours as needed for cough. Dispense: 60 capsule; Refill: 0  - amoxicillin (AMOXIL) 875 MG tablet; Take 1 tablet by mouth 2 times daily for 10 days  Dispense: 20 tablet; Refill: 0    2. Non-seasonal allergic rhinitis due to pollen    con't flonase  Stop zyrtec  Trial allegra    - fexofenadine (ALLEGRA) 180 MG tablet; Take 1 tablet by mouth daily  Dispense: 90 tablet; Refill: 3    3.  Obesity (BMI 30-39. 9)    Discussed wt loss strategies  All questions answered  Reassess JULIO      DISPOSITION    Return if symptoms worsen or fail to improve. Violet Pelayo released without restrictions. Future Appointments  Date Time Provider Stanislav Tellez   1/28/2019 10:00 AM Bonnie Mancilla DO [facility] [address]    Patient given educational materials on: See Attached    Barriers to learning and self management: none    Discussed use, benefit, and side effects of prescribed medications. Barriers to medication compliance addressed. All patient questions answered. Pt voiced understanding.        Electronically signed by Bonnie Mancilla DO on 9/11/2018 at 4:44 PM

## 2018-09-11 NOTE — PATIENT INSTRUCTIONS
You may receive a survey about your visit with us today. The feedback from our patients helps us identify what is working well and where the service to all patients can be enhanced. Thank you! Patient Education        Allergies: Care Instructions  Your Care Instructions    Allergies occur when your body's defense system (immune system) overreacts to certain substances. The immune system treats a harmless substance as if it were a harmful germ or virus. Many things can cause this overreaction, including pollens, medicine, food, dust, animal dander, and mold. Allergies can be mild or severe. Mild allergies can be managed with home treatment. But medicine may be needed to prevent problems. Managing your allergies is an important part of staying healthy. Your doctor may suggest that you have allergy testing to help find out what is causing your allergies. When you know what things trigger your symptoms, you can avoid them. This can prevent allergy symptoms and other health problems. For severe allergies that cause reactions that affect your whole body (anaphylactic reactions), your doctor may prescribe a shot of epinephrine to carry with you in case you have a severe reaction. Learn how to give yourself the shot and keep it with you at all times. Make sure it is not . Follow-up care is a key part of your treatment and safety. Be sure to make and go to all appointments, and call your doctor if you are having problems. It's also a good idea to know your test results and keep a list of the medicines you take. How can you care for yourself at home? · If you have been told by your doctor that dust or dust mites are causing your allergy, decrease the dust around your bed:  [facility] sheets, pillowcases, and other bedding in hot water every week. ¨ Use dust-proof covers for pillows, duvets, and mattresses. Avoid plastic covers because they tear easily and do not \"breathe. \" Wash as instructed on the label.   ¨ Do not use any blankets and pillows that you do not need. ¨ Use blankets that you can wash in your washing machine. ¨ Consider removing drapes and carpets, which attract and hold dust, from your bedroom. · If you are allergic to house dust and mites, do not use home humidifiers. Your doctor can suggest ways you can control dust and mites. · Look for signs of cockroaches. Cockroaches cause allergic reactions. Use cockroach baits to get rid of them. Then, clean your home well. Cockroaches like areas where grocery bags, newspapers, empty bottles, or cardboard boxes are stored. Do not keep these inside your home, and keep trash and food containers sealed. Seal off any spots where cockroaches might enter your home. · If you are allergic to mold, get rid of furniture, rugs, and drapes that smell musty. Check for mold in the bathroom. · If you are allergic to outdoor pollen or mold spores, use air-conditioning. Change or clean all filters every month. Keep windows closed. · If you are allergic to pollen, stay inside when pollen counts are high. Use a vacuum  with a HEPA filter or a double-thickness filter at least two times each week. · Stay inside when air pollution is bad. Avoid paint fumes, perfumes, and other strong odors. · Avoid conditions that make your allergies worse. Stay away from smoke. Do not smoke or let anyone else smoke in your house. Do not use fireplaces or wood-burning stoves. · If you are allergic to your pets, change the air filter in your furnace every month. Use high-efficiency filters. · If you are allergic to pet dander, keep pets outside or out of your bedroom. Old carpet and cloth furniture can hold a lot of animal dander. You may need to replace them. When should you call for help?   Give an epinephrine shot if:    · You think you are having a severe allergic reaction.     · You have symptoms in more than one body area, such as mild nausea and an itchy mouth.    After giving an not serious. They usually get better with time and self-care. Antibiotics are not used to treat a viral infection. That's because antibiotics will not help cure a viral illness. In some cases, antiviral medicine can help your body fight a serious viral infection. Follow-up care is a key part of your treatment and safety. Be sure to make and go to all appointments, and call your doctor if you are having problems. It's also a good idea to know your test results and keep a list of the medicines you take. How can you care for yourself at home? · Rest as much as possible until you feel better. · Be safe with medicines. Take your medicine exactly as prescribed. Call your doctor if you think you are having a problem with your medicine. You will get more details on the specific medicine your doctor prescribes. · Take an over-the-counter pain medicine, such as acetaminophen (Tylenol), ibuprofen (Advil, Motrin), or naproxen (Aleve), as needed for pain and fever. Read and follow all instructions on the label. Do not give aspirin to anyone younger than 20. It has been linked to Reye syndrome, a serious illness. · Drink plenty of fluids, enough so that your urine is light yellow or clear like water. Hot fluids, such as tea or soup, may help relieve congestion in your nose and throat. If you have kidney, heart, or liver disease and have to limit fluids, talk with your doctor before you increase the amount of fluids you drink. · Try to clear mucus from your lungs by breathing deeply and coughing. · Gargle with warm salt water once an hour. This can help reduce swelling and throat pain. Use 1 teaspoon of salt mixed in 1 cup of warm water. · Do not smoke or allow others to smoke around you. If you need help quitting, talk to your doctor about stop-smoking programs and medicines. These can increase your chances of quitting for good. To avoid spreading the virus  · Cough or sneeze into a tissue.  Then throw the tissue away.  · If you don't have a tissue, use your hand to cover your cough or sneeze. Then clean your hand. You can also cough into your sleeve. · Wash your hands often. Use soap and warm water. Wash for 15 to 20 seconds each time. · If you don't have soap and water near you, you can clean your hands with alcohol wipes or gel. When should you call for help? Call your doctor now or seek immediate medical care if:    · You have a new or higher fever.     · Your fever lasts more than 48 hours.     · You have trouble breathing.     · You have a fever with a stiff neck or a severe headache.     · You are sensitive to light.     · You feel very sleepy or confused.    Watch closely for changes in your health, and be sure to contact your doctor if:    · You do not get better as expected. Where can you learn more? Go to [email or web address] and sign in to your Private[employer] account. Enter W819 in the [employer] box to learn more about \"Viral Respiratory Infection: Care Instructions. \"     If you do not have an account, please click on the \"Sign Up Now\" link. Current as of: December 6, 2017  Content Version: 11.7  © 5740-5258 [employer]. Care instructions adapted under license by [facility] ([facility]). If you have questions about a medical condition or this instruction, always ask your healthcare professional. [place] [address] any warranty or liability for your use of this information.

## 2018-09-13 ENCOUNTER — TELEPHONE (OUTPATIENT)
Dept: FAMILY MEDICINE CLINIC | Age: 23
End: 2018-09-13

## 2018-09-13 NOTE — TELEPHONE ENCOUNTER
I'd have her fill the ATB now, since her sxs are worsening  F/u if no better with ATB and or if sxs worsen  ER over the weekend if sxs sig worsen  Let me know if questions, thanks!

## 2018-11-21 ENCOUNTER — NURSE ONLY (OUTPATIENT)
Dept: FAMILY MEDICINE CLINIC | Age: 23
End: 2018-11-21
Payer: COMMERCIAL

## 2018-11-21 DIAGNOSIS — Z23 NEED FOR INFLUENZA VACCINATION: Primary | ICD-10-CM

## 2018-11-21 PROCEDURE — 90686 IIV4 VACC NO PRSV 0.5 ML IM: CPT | Performed by: FAMILY MEDICINE

## 2018-11-21 PROCEDURE — 90471 IMMUNIZATION ADMIN: CPT | Performed by: FAMILY MEDICINE

## 2018-11-21 NOTE — PROGRESS NOTES
Immunization(s) given during visit:    Immunizations     Name Date Dose Route    Influenza, [place], 3 yrs and older, IM, PF (Fluzone 3 yrs and older or Afluria 5 yrs and older) 11/21/2018 0.5 mL Intramuscular    Site: Deltoid- Right    Lot: [id number]    NDC: 47035-700-97          Most recent Vaccine Information Sheet dated 08/07/15 given to dasia

## 2018-12-06 DIAGNOSIS — J30.1 CHRONIC SEASONAL ALLERGIC RHINITIS DUE TO POLLEN: Chronic | ICD-10-CM

## 2018-12-06 DIAGNOSIS — J30.1 NON-SEASONAL ALLERGIC RHINITIS DUE TO POLLEN: Chronic | ICD-10-CM

## 2018-12-06 RX ORDER — FEXOFENADINE HCL 180 MG/1
180 TABLET ORAL DAILY
Qty: 90 TABLET | Refills: 3 | Status: SHIPPED | OUTPATIENT
Start: 2018-12-06 | End: 2019-09-28 | Stop reason: SDUPTHER

## 2018-12-06 RX ORDER — FLUTICASONE PROPIONATE 50 MCG
2 SPRAY, SUSPENSION (ML) NASAL DAILY
Qty: 3 BOTTLE | Refills: 3 | Status: SHIPPED | OUTPATIENT
Start: 2018-12-06 | End: 2020-11-29 | Stop reason: SDUPTHER

## 2018-12-10 ENCOUNTER — TELEPHONE (OUTPATIENT)
Dept: FAMILY MEDICINE CLINIC | Age: 23
End: 2018-12-10

## 2018-12-11 NOTE — PROGRESS NOTES
Chief Complaint   Patient presents with    Follow-up     issues as noted below       History obtained from the patient. SUBJECTIVE:  Nicole Blackwell is a 26 y.o. female that presents today for     -01. Asthma: doing well. History of asthma?: Yes  Current medication regimen -     Frequency of day symptoms?  occ   Frequency of night time Sx?  never    Chronic cough?: no  Chest pain/Tightness?:  no  Shortness of breath?: no  Wheezing?  no    Last PFTs - NOV 2017    Known triggers? Yes  Hospitalized and/or intubated in the past?: No  Smoker or smoke exposure in the home?: No  Number of times prescribed oral steroids in the past year - 0  FamHx of asthma, eczema or allergic rhinitis? No      -02. Allergies LAST VISIT: on zyrtec and flonase. Feels zyrtec not helping as much as it used to. Inc itchy eyes and sneezing. Nasal sxs controlled with flonase. States claritin doesn't help. Never on allegra. UPDATE TODAY: stable, doing well. meds working fine, keeping sxs under control.        -03. Obesity/PCOS: making life style changes. Has dropped wt., though still up from last time. Exercising more now. On OCP. Wt Readings from Last 3 Encounters:   12/12/18 248 lb 9.6 oz (112.8 kg)   09/11/18 242 lb (109.8 kg)   06/09/18 230 lb (104.3 kg)       -04. BP issues: pt worried about HTN. However, BP's have been running 120-130/80-85. + fam hx of HTN. No CP/SOB.        Age/Gender Health Maintenance    Lipid - age 30  DM Screen -   Lab Results   Component Value Date    GLUCOSE 86 04/15/2016    GLUCOSE 97 10/19/2011       Colon Cancer Screening - age 50  Lung Cancer Screening (Age 54 to [de-identified] with 27 pack year hx, current smoker or quit within past 15 years) - never smoker    Tetanus - UTD 2009  Influenza Vaccine - UTD FALL 2018  Pneumonia Vaccine - UTD PPV 23 JAN 2018  Zostavax - age 63     Breast Cancer Screening - age 44-55  Cervical Cancer Screening - age 26  Osteoporosis Screening - age 74    Falls screening - n/a      Current Outpatient Prescriptions   Medication Sig Dispense Refill    fexofenadine (ALLEGRA) 180 MG tablet Take 1 tablet by mouth daily 90 tablet 3    fluticasone (FLONASE) 50 MCG/ACT nasal spray 2 sprays by Nasal route daily 3 Bottle 3    Norgestimate-Eth Estradiol (SPRINTEC 28 PO) Take by mouth      ibuprofen (ADVIL;MOTRIN) 200 MG tablet Take 200 mg by mouth every 6 hours as needed for Pain      albuterol sulfate HFA (VENTOLIN HFA) 108 (90 Base) MCG/ACT inhaler Inhale 2 puffs into the lungs every 4 hours as needed for Wheezing or Shortness of Breath 1 Inhaler 0    Spacer/Aero-Holding Chambers ROCIO 1 Device by Does not apply route daily as needed. 1 Device 0     No current facility-administered medications for this visit. No orders of the defined types were placed in this encounter. All medications reviewed and reconciled, including OTC and herbal medications. Updated list given to patient. Patient Active Problem List    Diagnosis Date Noted    Allergic rhinitis     History of pneumonia     Mild intermittent asthma     PCOS (polycystic ovarian syndrome)     Obesity (BMI 30-39. 9)        Past Medical History:   Diagnosis Date    Allergic rhinitis     History of pneumonia     Mild intermittent asthma     Obesity (BMI 30-39. 9)     PCOS (polycystic ovarian syndrome)        Past Surgical History:   Procedure Laterality Date    TONSILLECTOMY      adnoids    WISDOM TOOTH EXTRACTION         No Known Allergies      Social History     Social History    Marital status: Single     Spouse name: N/A    Number of children: N/A    Years of education: N/A     Occupational History    Not on file.      Social History Main Topics    Smoking status: Never Smoker    Smokeless tobacco: Never Used    Alcohol use No    Drug use: No    Sexual activity: Not Currently     Other Topics Concern    Not on file     Social History Narrative    No narrative on file         Family History

## 2018-12-12 ENCOUNTER — OFFICE VISIT (OUTPATIENT)
Dept: FAMILY MEDICINE CLINIC | Age: 23
End: 2018-12-12
Payer: COMMERCIAL

## 2018-12-12 VITALS
RESPIRATION RATE: 12 BRPM | SYSTOLIC BLOOD PRESSURE: 136 MMHG | HEART RATE: 64 BPM | TEMPERATURE: 97.6 F | BODY MASS INDEX: 39.95 KG/M2 | HEIGHT: 66 IN | WEIGHT: 248.6 LBS | DIASTOLIC BLOOD PRESSURE: 78 MMHG

## 2018-12-12 DIAGNOSIS — J45.20 MILD INTERMITTENT ASTHMA WITHOUT COMPLICATION: Primary | Chronic | ICD-10-CM

## 2018-12-12 DIAGNOSIS — R03.0 PREHYPERTENSION: ICD-10-CM

## 2018-12-12 DIAGNOSIS — J30.1 NON-SEASONAL ALLERGIC RHINITIS DUE TO POLLEN: Chronic | ICD-10-CM

## 2018-12-12 DIAGNOSIS — E66.9 OBESITY (BMI 30-39.9): Chronic | ICD-10-CM

## 2018-12-12 DIAGNOSIS — E28.2 PCOS (POLYCYSTIC OVARIAN SYNDROME): Chronic | ICD-10-CM

## 2018-12-12 PROCEDURE — 1036F TOBACCO NON-USER: CPT | Performed by: FAMILY MEDICINE

## 2018-12-12 PROCEDURE — 99214 OFFICE O/P EST MOD 30 MIN: CPT | Performed by: FAMILY MEDICINE

## 2018-12-12 PROCEDURE — G8427 DOCREV CUR MEDS BY ELIG CLIN: HCPCS | Performed by: FAMILY MEDICINE

## 2018-12-12 PROCEDURE — G8482 FLU IMMUNIZE ORDER/ADMIN: HCPCS | Performed by: FAMILY MEDICINE

## 2018-12-12 PROCEDURE — G8417 CALC BMI ABV UP PARAM F/U: HCPCS | Performed by: FAMILY MEDICINE

## 2018-12-12 ASSESSMENT — PATIENT HEALTH QUESTIONNAIRE - PHQ9
SUM OF ALL RESPONSES TO PHQ QUESTIONS 1-9: 0
1. LITTLE INTEREST OR PLEASURE IN DOING THINGS: 0
SUM OF ALL RESPONSES TO PHQ QUESTIONS 1-9: 0
2. FEELING DOWN, DEPRESSED OR HOPELESS: 0
SUM OF ALL RESPONSES TO PHQ9 QUESTIONS 1 & 2: 0

## 2018-12-13 ENCOUNTER — NURSE ONLY (OUTPATIENT)
Dept: LAB | Age: 23
End: 2018-12-13

## 2018-12-13 DIAGNOSIS — R03.0 PREHYPERTENSION: ICD-10-CM

## 2018-12-13 LAB
ALBUMIN SERPL-MCNC: 4.4 G/DL (ref 3.5–5.1)
ALP BLD-CCNC: 73 U/L (ref 38–126)
ALT SERPL-CCNC: 18 U/L (ref 11–66)
ANION GAP SERPL CALCULATED.3IONS-SCNC: 14 MEQ/L (ref 8–16)
AST SERPL-CCNC: 20 U/L (ref 5–40)
BASOPHILS # BLD: 0.5 %
BASOPHILS ABSOLUTE: 0 THOU/MM3 (ref 0–0.1)
BILIRUB SERPL-MCNC: 0.3 MG/DL (ref 0.3–1.2)
BUN BLDV-MCNC: 18 MG/DL (ref 7–22)
CALCIUM SERPL-MCNC: 10 MG/DL (ref 8.5–10.5)
CHLORIDE BLD-SCNC: 101 MEQ/L (ref 98–111)
CHOLESTEROL, TOTAL: 214 MG/DL (ref 100–199)
CO2: 26 MEQ/L (ref 23–33)
CREAT SERPL-MCNC: 1.1 MG/DL (ref 0.4–1.2)
CREATININE, URINE: 229.6 MG/DL
EOSINOPHIL # BLD: 1.3 %
EOSINOPHILS ABSOLUTE: 0.1 THOU/MM3 (ref 0–0.4)
ERYTHROCYTE [DISTWIDTH] IN BLOOD BY AUTOMATED COUNT: 12.7 % (ref 11.5–14.5)
ERYTHROCYTE [DISTWIDTH] IN BLOOD BY AUTOMATED COUNT: 40 FL (ref 35–45)
GFR SERPL CREATININE-BSD FRML MDRD: 62 ML/MIN/1.73M2
GLUCOSE BLD-MCNC: 87 MG/DL (ref 70–108)
HCT VFR BLD CALC: 47.2 % (ref 37–47)
HDLC SERPL-MCNC: 56 MG/DL
HEMOGLOBIN: 16 GM/DL (ref 12–16)
IMMATURE GRANS (ABS): 0.01 THOU/MM3 (ref 0–0.07)
IMMATURE GRANULOCYTES: 0.2 %
LDL CHOLESTEROL CALCULATED: 137 MG/DL
LYMPHOCYTES # BLD: 44.5 %
LYMPHOCYTES ABSOLUTE: 2.4 THOU/MM3 (ref 1–4.8)
MCH RBC QN AUTO: 29.6 PG (ref 26–33)
MCHC RBC AUTO-ENTMCNC: 33.9 GM/DL (ref 32.2–35.5)
MCV RBC AUTO: 87.4 FL (ref 81–99)
MICROALBUMIN UR-MCNC: 17.86 MG/DL
MICROALBUMIN/CREAT UR-RTO: 78 MG/G (ref 0–30)
MONOCYTES # BLD: 8.9 %
MONOCYTES ABSOLUTE: 0.5 THOU/MM3 (ref 0.4–1.3)
NUCLEATED RED BLOOD CELLS: 0 /100 WBC
PLATELET # BLD: 255 THOU/MM3 (ref 130–400)
PMV BLD AUTO: 10.5 FL (ref 9.4–12.4)
POTASSIUM SERPL-SCNC: 4.1 MEQ/L (ref 3.5–5.2)
RBC # BLD: 5.4 MILL/MM3 (ref 4.2–5.4)
SEG NEUTROPHILS: 44.6 %
SEGMENTED NEUTROPHILS ABSOLUTE COUNT: 2.5 THOU/MM3 (ref 1.8–7.7)
SODIUM BLD-SCNC: 141 MEQ/L (ref 135–145)
T4 FREE: 1.46 NG/DL (ref 0.93–1.76)
TOTAL PROTEIN: 8.3 G/DL (ref 6.1–8)
TRIGL SERPL-MCNC: 103 MG/DL (ref 0–199)
TSH SERPL DL<=0.05 MIU/L-ACNC: 4.46 UIU/ML (ref 0.4–4.2)
WBC # BLD: 5.5 THOU/MM3 (ref 4.8–10.8)

## 2018-12-14 ENCOUNTER — TELEPHONE (OUTPATIENT)
Dept: FAMILY MEDICINE CLINIC | Age: 23
End: 2018-12-14

## 2018-12-14 DIAGNOSIS — R80.9 MICROALBUMINURIA: ICD-10-CM

## 2018-12-14 DIAGNOSIS — N18.9 CHRONIC KIDNEY DISEASE, UNSPECIFIED CKD STAGE: ICD-10-CM

## 2018-12-14 DIAGNOSIS — R79.89 ELEVATED TSH: Primary | ICD-10-CM

## 2018-12-18 ENCOUNTER — NURSE ONLY (OUTPATIENT)
Dept: LAB | Age: 23
End: 2018-12-18

## 2018-12-18 DIAGNOSIS — R80.9 MICROALBUMINURIA: ICD-10-CM

## 2018-12-18 DIAGNOSIS — N18.9 CHRONIC KIDNEY DISEASE, UNSPECIFIED CKD STAGE: ICD-10-CM

## 2018-12-18 DIAGNOSIS — R79.89 ELEVATED TSH: ICD-10-CM

## 2018-12-18 LAB
ANION GAP SERPL CALCULATED.3IONS-SCNC: 13 MEQ/L (ref 8–16)
BUN BLDV-MCNC: 19 MG/DL (ref 7–22)
CALCIUM SERPL-MCNC: 9.6 MG/DL (ref 8.5–10.5)
CHLORIDE BLD-SCNC: 102 MEQ/L (ref 98–111)
CO2: 25 MEQ/L (ref 23–33)
CREAT SERPL-MCNC: 1 MG/DL (ref 0.4–1.2)
CREATININE URINE: 199.4 MG/DL
CREATININE, URINE: 199.4 MG/DL
GFR SERPL CREATININE-BSD FRML MDRD: 70 ML/MIN/1.73M2
GLUCOSE BLD-MCNC: 79 MG/DL (ref 70–108)
MICROALBUMIN UR-MCNC: 6.91 MG/DL
MICROALBUMIN/CREAT UR-RTO: 35 MG/G (ref 0–30)
POTASSIUM SERPL-SCNC: 4.2 MEQ/L (ref 3.5–5.2)
PROT/CREAT RATIO, UR: 0.12
PROTEIN, URINE: 24.6 MG/DL
SODIUM BLD-SCNC: 140 MEQ/L (ref 135–145)
T4 FREE: 1.49 NG/DL (ref 0.93–1.76)
TSH SERPL DL<=0.05 MIU/L-ACNC: 1.16 UIU/ML (ref 0.4–4.2)

## 2018-12-19 LAB — T3 FREE: 3.56 PG/ML (ref 2.02–4.43)

## 2018-12-20 ENCOUNTER — TELEPHONE (OUTPATIENT)
Dept: FAMILY MEDICINE CLINIC | Age: 23
End: 2018-12-20

## 2018-12-20 DIAGNOSIS — R80.9 MICROALBUMINURIA: ICD-10-CM

## 2018-12-21 ENCOUNTER — TELEPHONE (OUTPATIENT)
Dept: FAMILY MEDICINE CLINIC | Age: 23
End: 2018-12-21

## 2018-12-21 LAB — THYROID PEROXIDASE ANTIBODY: 0.7 IU/ML (ref 0–9)

## 2019-01-07 ENCOUNTER — TELEPHONE (OUTPATIENT)
Dept: FAMILY MEDICINE CLINIC | Age: 24
End: 2019-01-07

## 2019-01-07 ENCOUNTER — OFFICE VISIT (OUTPATIENT)
Dept: FAMILY MEDICINE CLINIC | Age: 24
End: 2019-01-07
Payer: COMMERCIAL

## 2019-01-07 VITALS
DIASTOLIC BLOOD PRESSURE: 80 MMHG | WEIGHT: 245 LBS | RESPIRATION RATE: 16 BRPM | SYSTOLIC BLOOD PRESSURE: 124 MMHG | HEIGHT: 66 IN | TEMPERATURE: 98.3 F | BODY MASS INDEX: 39.37 KG/M2 | HEART RATE: 64 BPM

## 2019-01-07 DIAGNOSIS — J45.20 MILD INTERMITTENT ASTHMA WITHOUT COMPLICATION: Chronic | ICD-10-CM

## 2019-01-07 DIAGNOSIS — J45.30 MILD PERSISTENT ASTHMA WITHOUT COMPLICATION: Primary | ICD-10-CM

## 2019-01-07 DIAGNOSIS — J01.90 ACUTE RHINOSINUSITIS: Primary | ICD-10-CM

## 2019-01-07 PROCEDURE — G8427 DOCREV CUR MEDS BY ELIG CLIN: HCPCS | Performed by: FAMILY MEDICINE

## 2019-01-07 PROCEDURE — G8482 FLU IMMUNIZE ORDER/ADMIN: HCPCS | Performed by: FAMILY MEDICINE

## 2019-01-07 PROCEDURE — 99213 OFFICE O/P EST LOW 20 MIN: CPT | Performed by: FAMILY MEDICINE

## 2019-01-07 PROCEDURE — G8417 CALC BMI ABV UP PARAM F/U: HCPCS | Performed by: FAMILY MEDICINE

## 2019-01-07 PROCEDURE — 1036F TOBACCO NON-USER: CPT | Performed by: FAMILY MEDICINE

## 2019-01-07 RX ORDER — BENZONATATE 100 MG/1
CAPSULE ORAL
Qty: 60 CAPSULE | Refills: 0 | Status: SHIPPED | OUTPATIENT
Start: 2019-01-07 | End: 2019-01-17

## 2019-01-07 RX ORDER — AMOXICILLIN AND CLAVULANATE POTASSIUM 875; 125 MG/1; MG/1
1 TABLET, FILM COATED ORAL 2 TIMES DAILY
Qty: 20 TABLET | Refills: 0 | Status: SHIPPED | OUTPATIENT
Start: 2019-01-07 | End: 2019-01-17

## 2019-01-07 RX ORDER — ALBUTEROL SULFATE 90 UG/1
2 AEROSOL, METERED RESPIRATORY (INHALATION) EVERY 4 HOURS PRN
Qty: 2 INHALER | Refills: 3 | Status: SHIPPED | OUTPATIENT
Start: 2019-01-07 | End: 2020-09-29 | Stop reason: SDUPTHER

## 2019-05-18 ENCOUNTER — HOSPITAL ENCOUNTER (EMERGENCY)
Age: 24
Discharge: HOME | End: 2019-05-18
Payer: COMMERCIAL

## 2019-05-18 VITALS
WEIGHT: 230 LBS | OXYGEN SATURATION: 98 % | HEART RATE: 87 BPM | BODY MASS INDEX: 36.96 KG/M2 | DIASTOLIC BLOOD PRESSURE: 94 MMHG | SYSTOLIC BLOOD PRESSURE: 134 MMHG | HEIGHT: 66 IN | TEMPERATURE: 98.4 F | RESPIRATION RATE: 16 BRPM

## 2019-05-18 DIAGNOSIS — J30.2 SEASONAL ALLERGIES: ICD-10-CM

## 2019-05-18 DIAGNOSIS — J02.9 ACUTE PHARYNGITIS, UNSPECIFIED ETIOLOGY: Primary | ICD-10-CM

## 2019-05-18 DIAGNOSIS — J06.9 ACUTE UPPER RESPIRATORY INFECTION: ICD-10-CM

## 2019-05-18 LAB
GROUP A STREP CULTURE, REFLEX: NEGATIVE
REFLEX THROAT C + S: NORMAL

## 2019-05-18 PROCEDURE — 87070 CULTURE OTHR SPECIMN AEROBIC: CPT

## 2019-05-18 PROCEDURE — 99213 OFFICE O/P EST LOW 20 MIN: CPT

## 2019-05-18 PROCEDURE — 99214 OFFICE O/P EST MOD 30 MIN: CPT | Performed by: NURSE PRACTITIONER

## 2019-05-18 PROCEDURE — 87880 STREP A ASSAY W/OPTIC: CPT

## 2019-05-18 RX ORDER — AMOXICILLIN 500 MG/1
500 CAPSULE ORAL 3 TIMES DAILY
Qty: 30 CAPSULE | Refills: 0 | Status: SHIPPED | OUTPATIENT
Start: 2019-05-18 | End: 2019-05-28

## 2019-05-18 ASSESSMENT — ENCOUNTER SYMPTOMS
COUGH: 1
SORE THROAT: 0
WHEEZING: 0
STRIDOR: 0
DIARRHEA: 0
SINUS PRESSURE: 1
SINUS PAIN: 1
VOMITING: 0
NAUSEA: 0
RHINORRHEA: 1
SHORTNESS OF BREATH: 0

## 2019-05-18 ASSESSMENT — PAIN SCALES - GENERAL: PAINLEVEL_OUTOF10: 7

## 2019-05-18 ASSESSMENT — PAIN DESCRIPTION - LOCATION: LOCATION: THROAT

## 2019-05-18 NOTE — ED PROVIDER NOTES
[facility]  Urgent Care Encounter       CHIEF COMPLAINT       Chief Complaint   Patient presents with    Pharyngitis     hard to talk    Sinusitis     both ears hurt       Nurses Notes reviewed and I agree except as noted in the HPI. HISTORY OF PRESENT ILLNESS   Magda Hassan is a 26 y.o. female who presents     HPI    REVIEW OF SYSTEMS     Review of Systems   Constitutional: Negative for chills, fatigue and fever. HENT: Positive for congestion, ear pain (Left ear), postnasal drip, rhinorrhea, sinus pressure and sinus pain. Negative for sore throat. Eyes: Negative for visual disturbance. Respiratory: Positive for cough. Negative for shortness of breath, wheezing and stridor. Cardiovascular: Negative for chest pain. Gastrointestinal: Negative for diarrhea, nausea and vomiting. Genitourinary: Negative for difficulty urinating and dysuria. Musculoskeletal: Negative for neck pain. Skin: Negative for rash. Allergic/Immunologic: Negative for environmental allergies and food allergies. Neurological: Positive for headaches. Negative for dizziness, weakness and light-headedness. Psychiatric/Behavioral: Positive for sleep disturbance. PAST MEDICAL HISTORY         Diagnosis Date    Allergic rhinitis     History of pneumonia     Mild intermittent asthma     Obesity (BMI 30-39. 9)     PCOS (polycystic ovarian syndrome)        SURGICALHISTORY     Patient  has a past surgical history that includes Tonsillectomy and [place] tooth extraction.     CURRENT MEDICATIONS       Discharge Medication List as of 5/18/2019  3:56 PM      CONTINUE these medications which have NOT CHANGED    Details   albuterol sulfate HFA (VENTOLIN HFA) 108 (90 Base) MCG/ACT inhaler Inhale 2 puffs into the lungs every 4 hours as needed for Wheezing or Shortness of Breath, Disp-2 Inhaler, R-3Normal      fexofenadine (ALLEGRA) 180 MG tablet Take 1 tablet by mouth daily, Disp-90 tablet, R-3Normal fluticasone (FLONASE) 50 MCG/ACT nasal spray 2 sprays by Nasal route daily, Disp-3 Bottle, R-3Normal      Norgestimate-Eth Estradiol (SPRINTEC 28 PO) Take by mouthHistorical Med      ibuprofen (ADVIL;MOTRIN) 200 MG tablet Take 200 mg by mouth every 6 hours as needed for PainHistorical Med      Spacer/Aero-Holding Chambers ROCIO DAILY PRN Starting 11/26/2014, Until Discontinued, Disp-1 Device, R-0, Print             ALLERGIES     Patient is has No Known Allergies. Patients   Immunization History   Administered Date(s) Administered    DTaP (Infanrix) 1997, 1997, 01/29/1998, 01/28/1999, 08/07/2002    HIB PRP-T (ActHIB, Hiberix) 1997, 1997, 01/29/1998, 01/28/1999    HPV Gardasil Quadrivalent 06/17/2009, 12/30/2009, 08/31/2016    Hepatitis A Ped/Adol (Vaqta) 06/17/2009, 12/30/2009    Hepatitis B Ped/Adol (Recombivax HB) 1997, 1997, 01/29/1998    IPV (Ipol) 1997, 1997, 06/18/1998, 08/07/2002    Influenza, [place], 3 Years and older, IM (Fluzone 3 yrs and older or Afluria 5 yrs and older) 10/26/2017    Influenza, [place], 3 yrs and older, IM, PF (Fluzone 3 yrs and older or Afluria 5 yrs and older) 11/21/2018    MMR 06/18/1999, 08/07/2002    Meningococcal MCV4P (Menactra) 06/17/2009, 08/31/2016    Pneumococcal Polysaccharide ([id number]) 01/26/2018    Tdap (Boostrix, Adacel) 09/27/2009       FAMILY HISTORY     Patient's family history includes Diabetes in her mother; High Blood Pressure in her father and mother; High Cholesterol in her father. SOCIAL HISTORY     Patient  reports that she has never smoked. She has never used smokeless tobacco. She reports that she does not drink alcohol or use drugs. PHYSICAL EXAM     ED TRIAGE VITALS  BP: (!) 134/94, Temp: 98.4 °F (36.9 °C), Pulse: 87, Resp: 16, SpO2: 98 %,Estimated body mass index is 37.12 kg/m² as calculated from the following:    Height as of this encounter: 5' 6\" (1.676 m).     Weight as of this encounter: 230 lb (104.3 kg). ,Patient's last menstrual period was 04/22/2019. Physical Exam   Constitutional: She is oriented to person, place, and time. She appears well-developed and well-nourished. No distress. HENT:   Head: Normocephalic and atraumatic. Right Ear: External ear normal.   Left Ear: External ear normal.   Mouth/Throat: Oropharynx is clear and moist. No oropharyngeal exudate. Eyes: Conjunctivae and EOM are normal. Right eye exhibits no discharge. Left eye exhibits no discharge. Right conjunctiva is not injected. Left conjunctiva is not injected. No scleral icterus. Right eye exhibits normal extraocular motion. Left eye exhibits normal extraocular motion. Pupils are equal.   Neck: Normal range of motion. Cardiovascular: Normal rate, regular rhythm and normal heart sounds. Pulmonary/Chest: Effort normal and breath sounds normal. No accessory muscle usage. Musculoskeletal: Normal range of motion. Right knee: She exhibits normal range of motion. Left knee: She exhibits normal range of motion. Lymphadenopathy:        Head (right side): No submental, no submandibular, no tonsillar, no preauricular, no posterior auricular and no occipital adenopathy present. Head (left side): No submental, no submandibular, no tonsillar, no preauricular, no posterior auricular and no occipital adenopathy present. Neurological: She is alert and oriented to person, place, and time. No sensory deficit. Skin: Skin is warm and dry. Capillary refill takes less than 2 seconds. She is not diaphoretic. Psychiatric: She has a normal mood and affect.  Her behavior is normal.       DIAGNOSTIC RESULTS     Labs:  Results for orders placed or performed during the hospital encounter of 05/18/19   Strep A culture, throat   Result Value Ref Range    REFLEX THROAT C + S INDICATED    STREP A ANTIGEN   Result Value Ref Range    GROUP A STREP CULTURE, REFLEX NEGATIVE        IMAGING:    No orders to display     URGENT CARE COURSE:     Vitals:    05/18/19 1527 05/18/19 1538   BP: (!) 150/90 (!) 134/94   Pulse: 87    Resp: 16    Temp: 98.4 °F (36.9 °C)    TempSrc: Temporal    SpO2: 98%    Weight: 230 lb (104.3 kg)    Height: 5' 6\" (1.676 m)        Medications - No data to display         PROCEDURES:  None    FINAL IMPRESSION      1. Acute pharyngitis, unspecified etiology    2. Acute upper respiratory infection    3. Seasonal allergies          DISPOSITION/ PLAN   Patient is discharged home with prescription for amoxicillin that she may begin taking within the next 4 days if symptoms do not seem to be improving. Discussed with patient that strep swab was negative but will be sent for culture. Patient should continue taking her Allegra as seasonal allergies can also cause inflammation to airways. Follow-up with primary care provider within one week after starting antibiotic if symptoms still have not improved.         PATIENT REFERRED TO:  Lior Palma [address]  / [place] [id number]      DISCHARGE MEDICATIONS:  Discharge Medication List as of 5/18/2019  3:56 PM      START taking these medications    Details   amoxicillin (AMOXIL) 500 MG capsule Take 1 capsule by mouth 3 times daily for 10 days, Disp-30 capsule, R-0Print             Discharge Medication List as of 5/18/2019  3:56 PM          Discharge Medication List as of 5/18/2019  3:56 PM          WILLY Villegas NP    (Please note that portions of this note were completed with a voice recognition program. Efforts were made to edit the dictations but occasionally words are mis-transcribed.)         WILLY Liu NP  05/18/19 [id number]

## 2019-05-20 LAB — THROAT/NOSE CULTURE: NORMAL

## 2019-06-04 ENCOUNTER — TELEPHONE (OUTPATIENT)
Dept: FAMILY MEDICINE CLINIC | Age: 24
End: 2019-06-04

## 2019-06-04 ENCOUNTER — NURSE ONLY (OUTPATIENT)
Dept: LAB | Age: 24
End: 2019-06-04

## 2019-06-04 ENCOUNTER — OFFICE VISIT (OUTPATIENT)
Dept: FAMILY MEDICINE CLINIC | Age: 24
End: 2019-06-04
Payer: COMMERCIAL

## 2019-06-04 VITALS
RESPIRATION RATE: 16 BRPM | HEART RATE: 70 BPM | WEIGHT: 241 LBS | DIASTOLIC BLOOD PRESSURE: 88 MMHG | HEIGHT: 68 IN | TEMPERATURE: 98.3 F | SYSTOLIC BLOOD PRESSURE: 128 MMHG | BODY MASS INDEX: 36.53 KG/M2

## 2019-06-04 DIAGNOSIS — R03.0 TRANSIENT ELEVATED BLOOD PRESSURE: Primary | ICD-10-CM

## 2019-06-04 DIAGNOSIS — Z83.49 FAMILY HISTORY OF THYROID DISEASE: ICD-10-CM

## 2019-06-04 LAB
T4 FREE: 1.4 NG/DL (ref 0.93–1.76)
TSH SERPL DL<=0.05 MIU/L-ACNC: 3.38 UIU/ML (ref 0.4–4.2)

## 2019-06-04 PROCEDURE — G8417 CALC BMI ABV UP PARAM F/U: HCPCS | Performed by: NURSE PRACTITIONER

## 2019-06-04 PROCEDURE — 99213 OFFICE O/P EST LOW 20 MIN: CPT | Performed by: NURSE PRACTITIONER

## 2019-06-04 PROCEDURE — G8427 DOCREV CUR MEDS BY ELIG CLIN: HCPCS | Performed by: NURSE PRACTITIONER

## 2019-06-04 PROCEDURE — 1036F TOBACCO NON-USER: CPT | Performed by: NURSE PRACTITIONER

## 2019-06-04 ASSESSMENT — PATIENT HEALTH QUESTIONNAIRE - PHQ9
1. LITTLE INTEREST OR PLEASURE IN DOING THINGS: 0
2. FEELING DOWN, DEPRESSED OR HOPELESS: 0
SUM OF ALL RESPONSES TO PHQ QUESTIONS 1-9: 0
SUM OF ALL RESPONSES TO PHQ QUESTIONS 1-9: 0
SUM OF ALL RESPONSES TO PHQ9 QUESTIONS 1 & 2: 0

## 2019-06-04 ASSESSMENT — ENCOUNTER SYMPTOMS: RESPIRATORY NEGATIVE: 1

## 2019-06-04 NOTE — PROGRESS NOTES
[address]  [address] [address]  Dept: [phone]  Dept Fax: [phone]  Loc: [phone]    Geovanna Jolly is a 27 y.o. Maria Luz Loose presents today for her medical conditions/complaints as noted below. Socorro Resendiz c/o of Other (concerned over high blood pressure (family history)) and Discuss Labs (thyroid was in November  (weight))      HPI:      Pt here to discuss her blood pressure. Pt states there is a family history of HTN. She states she was out last night and checked her bp at the pharmacy and it was in the 160/89 range. She denies any headaches, vision changes, dizziness, or chest pain or heart palpitations. She does feel tried at times. She has never taken bp meds in the past. I did review her blood pressures from the past, it does appear to be transient. She states she is under a lot of stress right now trying to find a job. She is also trying to loose weight, she does exercise a few times a week, she did loose weight while in college but now with being home has gained some back. She is trying to cut portion sizes she does not count calories. We did discuss her thyroid and lipid results from last winter. She states there is a family history of thyroid disease. She would like her thyroid labs repeated. She does not smoke, she does drink a small amount of caffeine.           Current Outpatient Medications   Medication Sig Dispense Refill    albuterol sulfate HFA (VENTOLIN HFA) 108 (90 Base) MCG/ACT inhaler Inhale 2 puffs into the lungs every 4 hours as needed for Wheezing or Shortness of Breath 2 Inhaler 3    fexofenadine (ALLEGRA) 180 MG tablet Take 1 tablet by mouth daily 90 tablet 3    fluticasone (FLONASE) 50 MCG/ACT nasal spray 2 sprays by Nasal route daily 3 Bottle 3    Norgestimate-Eth Estradiol (SPRINTEC 28 PO) Take by mouth      ibuprofen (ADVIL;MOTRIN) 200 MG tablet Take 200 mg by mouth every 6 hours as needed for Pain      Spacer/Aero-Holding Chambers ROCIO 1 Device by Does not apply route daily as needed. 1 Device 0     No current facility-administered medications for this visit. Past Medical History:   Diagnosis Date    Allergic rhinitis     History of pneumonia     Mild intermittent asthma     Obesity (BMI 30-39. 9)     PCOS (polycystic ovarian syndrome)       Past Surgical History:   Procedure Laterality Date    TONSILLECTOMY      adnoids    WISDOM TOOTH EXTRACTION       Family History   Problem Relation Age of Onset    Diabetes Mother     High Blood Pressure Mother     High Blood Pressure Father     High Cholesterol Father     Colon Cancer Neg Hx     Breast Cancer Neg Hx      Social History     Tobacco Use    Smoking status: Never Smoker    Smokeless tobacco: Never Used   Substance Use Topics    Alcohol use: No        No Known Allergies    Health Maintenance   Topic Date Due    Varicella Vaccine (1 of 2 - 13+ 2-dose series) 06/04/2010    HIV screen  06/04/2012    Chlamydia screen  06/04/2013    DTaP/Tdap/Td vaccine (7 - Td) 09/27/2019    Cervical cancer screen  06/14/2021    HPV vaccine  Completed    Flu vaccine  Completed    Pneumococcal 0-64 years Vaccine  Completed       Subjective:      Review of Systems   Constitutional: Positive for fatigue. Negative for chills and fever. HENT: Negative for congestion. Eyes: Negative for visual disturbance. Respiratory: Negative. Cardiovascular: Negative. Genitourinary: Negative for difficulty urinating. Skin: Negative. Neurological: Negative for dizziness, weakness, light-headedness and headaches. Psychiatric/Behavioral: Negative for self-injury, sleep disturbance and suicidal ideas.        Objective:      /88   Pulse 70   Temp 98.3 °F (36.8 °C) (Oral)   Resp 16   Ht 5' 7.5\" (1.715 m)   Wt 241 lb (109.3 kg)   LMP 04/15/2019   BMI 37.19 kg/m²      Physical Exam   Constitutional: She appears well-developed and well-nourished. No distress. Eyes: Pupils are equal, round, and reactive to light. Conjunctivae and lids are normal.   Neck: No thyroid mass and no thyromegaly present. Cardiovascular: Normal rate, regular rhythm, normal heart sounds and intact distal pulses. No murmur heard. Pulmonary/Chest: Effort normal and breath sounds normal. No stridor. No respiratory distress. Skin: Skin is warm and dry. Capillary refill takes less than 2 seconds. Psychiatric: She has a normal mood and affect. Her behavior is normal. Judgment and thought content normal.   Nursing note and vitals reviewed. Assessment/Plan:           1. Transient elevated blood pressure  Continue to check at home  Keep a dairy  Return in 2 weeks for  Nurse visit for bp check    2. BMI 37.0-37.9, adult  1500 calorie diet info given to her  Increase aerobic physical activity  - TSH without Reflex; Future  - T4, Free; Future    3. Family history of thyroid disease    - TSH without Reflex; Future  - T4, Free; Future      Return in about 2 weeks (around 6/18/2019) for nurse visit for bp check. Reccommended tobaccocessation options including pharmacologic methods, counseled great than 3 minutesduring this visit:  Yes[]  No  []       Patient given educational materials -see patient instructions. Discussed use, benefit, and side effects of prescribedmedications. All patient questions answered. Pt voiced understanding. Reviewedhealth maintenance. Instructed to continue current medications, diet and exercise. Patient agreed with treatment plan. Follow up as directed.        Electronicallysigned by WILLY Perez CNP on 6/4/2019 at 11:56 AM

## 2019-06-04 NOTE — PATIENT INSTRUCTIONS
Patient Education        Learning About Cutting Calories  How do calories affect your weight? Food gives your body energy. Energy from the food you eat is measured in calories. This energy keeps your heart beating, your brain active, and your muscles working. Your body needs a certain number of calories each day. After your body uses the calories it needs, it stores extra calories as fat. To lose weight safely, you have to eat fewer calories while eating in a healthy way. How many calories do you need each day? The more active you are, the more calories you need. When you are less active, you need fewer calories. How many calories you need each day also depends on several things, including your age and whether you are male or female. Here are some general guidelines for adults:  · Less active women and older adults need 1,600 to 2,000 calories each day. · Active women and less active men need 2,000 to 2,400 calories each day. · Active men need 2,400 to 3,000 calories each day. How can you cut calories and eat healthy meals? Whole grains, vegetables and fruits, and dried beans are good lower-calorie foods. They give you lots of nutrients and fiber. And they fill you up. Sweets, energy drinks, and soda pop are high in calories. They give you few nutrients and no fiber. Try to limit soda pop, fruit juice, and energy drinks. Drink water instead. Some fats can be part of a healthy diet. But cutting back on fats from highly processed foods like fast foods and many snack foods is a good way to lower the calories in your diet. Also, use smaller amounts of fats like butter, margarine, salad dressing, and mayonnaise. Add fresh garlic, lemon, or herbs to your meals to add flavor without adding fat. Meats and dairy products can be a big source of hidden fats. Try to choose lean or low-fat versions of these products. Fat-free cookies, candies, chips, and frozen treats can still be high in sugar and calories.  Some fat-free foods have more calories than regular ones. Eat fat-free treats in moderation, as you would other foods. If your favorite foods are high in fat, salt, sugar, or calories, limit how often you eat them. Eat smaller servings, or look for healthy substitutes. Fill up on fruits, vegetables, and whole grains. Eating at home  · Use meat as a side dish instead of as the main part of your meal.  · Try main dishes that use whole wheat pasta, brown rice, dried beans, or vegetables. · Find ways to cook with little or no fat, such as broiling, steaming, or grilling. · Use cooking spray instead of oil. If you use oil, use a monounsaturated oil, such as canola or olive oil. · Trim fat from meats before you cook them. · Drain off fat after you brown the meat or while you roast it. · Chill soups and stews after you cook them. Then skim the fat off the top after it hardens. Eating out  · Order foods that are broiled or poached rather than fried or breaded. · Cut back on the amount of butter or margarine that you use on bread. · Order sauces, gravies, and salad dressings on the side, and use only a little. · When you order pasta, choose tomato sauce rather than cream sauce. · Ask for salsa with your baked potato instead of sour cream, butter, cheese, or ewing. · Order meals in a small size instead of upgrading to a large. · Share an entree, or take part of your food home to eat as another meal.  · Share appetizers and desserts. Where can you learn more? Go to https://AudioCaseFilesdarwin.healthLearnBIG. org and sign in to your KnowNow account. Enter T157 in the BeTheBeast box to learn more about \"Learning About Cutting Calories. \"     If you do not have an account, please click on the \"Sign Up Now\" link. Current as of: November 7, 2018  Content Version: 12.0  © 3842-0746 [employer]wise, Incorporated. Care instructions adapted under license by [facility] ([facility]).  If you have questions about a medical condition or this instruction, always ask your healthcare professional. [place] [address] any warranty or liability for your use of this information.

## 2019-06-18 ENCOUNTER — TELEPHONE (OUTPATIENT)
Dept: FAMILY MEDICINE CLINIC | Age: 24
End: 2019-06-18

## 2019-06-18 ENCOUNTER — NURSE ONLY (OUTPATIENT)
Dept: FAMILY MEDICINE CLINIC | Age: 24
End: 2019-06-18

## 2019-06-18 VITALS — SYSTOLIC BLOOD PRESSURE: 138 MMHG | DIASTOLIC BLOOD PRESSURE: 80 MMHG

## 2019-06-18 NOTE — TELEPHONE ENCOUNTER
Pt returned our call & states she believes her parents have a BP cuff so she will test her BP throughout the wk & bring the log w/her to her [place] on the 26th.

## 2019-06-18 NOTE — TELEPHONE ENCOUNTER
Neris Muller  That is normal, but borderline  Have her f/u in a wk for another BP check  Able to check at home at all?

## 2019-06-26 ENCOUNTER — TELEPHONE (OUTPATIENT)
Dept: FAMILY MEDICINE CLINIC | Age: 24
End: 2019-06-26

## 2019-06-26 ENCOUNTER — NURSE ONLY (OUTPATIENT)
Dept: FAMILY MEDICINE CLINIC | Age: 24
End: 2019-06-26

## 2019-06-26 VITALS — SYSTOLIC BLOOD PRESSURE: 124 MMHG | DIASTOLIC BLOOD PRESSURE: 88 MMHG | HEART RATE: 92 BPM

## 2019-06-26 DIAGNOSIS — R03.0 TRANSIENT ELEVATED BLOOD PRESSURE: Primary | ICD-10-CM

## 2019-06-28 ENCOUNTER — TELEPHONE (OUTPATIENT)
Dept: FAMILY MEDICINE CLINIC | Age: 24
End: 2019-06-28

## 2019-07-20 ENCOUNTER — HOSPITAL ENCOUNTER (EMERGENCY)
Age: 24
Discharge: HOME | End: 2019-07-20
Payer: COMMERCIAL

## 2019-07-20 VITALS
WEIGHT: 232.31 LBS | TEMPERATURE: 98.9 F | SYSTOLIC BLOOD PRESSURE: 130 MMHG | RESPIRATION RATE: 14 BRPM | BODY MASS INDEX: 37.33 KG/M2 | OXYGEN SATURATION: 97 % | DIASTOLIC BLOOD PRESSURE: 83 MMHG | HEART RATE: 104 BPM | HEIGHT: 66 IN

## 2019-07-20 DIAGNOSIS — J06.9 URI WITH COUGH AND CONGESTION: Primary | ICD-10-CM

## 2019-07-20 PROCEDURE — 99212 OFFICE O/P EST SF 10 MIN: CPT

## 2019-07-20 PROCEDURE — 99214 OFFICE O/P EST MOD 30 MIN: CPT | Performed by: NURSE PRACTITIONER

## 2019-07-20 RX ORDER — AMOXICILLIN 875 MG/1
875 TABLET, COATED ORAL 2 TIMES DAILY
Qty: 14 TABLET | Refills: 0 | Status: SHIPPED | OUTPATIENT
Start: 2019-07-20 | End: 2019-07-27

## 2019-07-20 RX ORDER — PREDNISONE 20 MG/1
20 TABLET ORAL 2 TIMES DAILY
Qty: 10 TABLET | Refills: 0 | Status: SHIPPED | OUTPATIENT
Start: 2019-07-20 | End: 2019-07-25

## 2019-07-20 ASSESSMENT — ENCOUNTER SYMPTOMS
NAUSEA: 0
SINUS PAIN: 1
SHORTNESS OF BREATH: 0
SINUS PRESSURE: 1
SORE THROAT: 1
DIARRHEA: 0
ABDOMINAL PAIN: 0
WHEEZING: 0
CONSTIPATION: 0
RHINORRHEA: 1
COUGH: 1

## 2019-07-20 ASSESSMENT — PAIN SCALES - GENERAL: PAINLEVEL_OUTOF10: 7

## 2019-07-20 ASSESSMENT — PAIN DESCRIPTION - PAIN TYPE: TYPE: ACUTE PAIN

## 2019-07-20 ASSESSMENT — PAIN DESCRIPTION - DESCRIPTORS: DESCRIPTORS: ACHING

## 2019-07-20 ASSESSMENT — PAIN DESCRIPTION - LOCATION: LOCATION: FACE

## 2019-07-20 NOTE — ED PROVIDER NOTES
[facility]  Urgent Care Encounter      CHIEF COMPLAINT       Chief Complaint   Patient presents with    Pharyngitis     sinus drainage       Nurses Notes reviewed and I agree except as noted in the HPI. HISTORY OF PRESENT ILLNESS   Gonzalez Oseguera is a 27 y.o. female who presents with 3 days of sinus drainage, sore throat, poor appetite, fatigue, bilateral otalgia, postnasal drip. Has an occasional cough but no wheeze or stridor. Denies fever, nausea vomiting diarrhea, constipation, abdominal pain or headache. History of seasonal allergies and asthma. REVIEW OF SYSTEMS     Review of Systems   Constitutional: Positive for appetite change and fatigue. Negative for diaphoresis and fever. HENT: Positive for congestion, ear pain, postnasal drip, rhinorrhea, sinus pressure, sinus pain and sore throat. Negative for tinnitus. Eyes: Negative for visual disturbance. Respiratory: Positive for cough. Negative for shortness of breath and wheezing. Cardiovascular: Negative for chest pain and leg swelling. Gastrointestinal: Negative for abdominal pain, constipation, diarrhea and nausea. Genitourinary: Negative for frequency. Musculoskeletal: Negative for arthralgias, myalgias and neck stiffness. Skin: Negative for rash. Neurological: Negative for dizziness, weakness and headaches. Psychiatric/Behavioral: The patient is not nervous/anxious. All other systems reviewed and are negative. PAST MEDICAL HISTORY         Diagnosis Date    Allergic rhinitis     History of pneumonia     Mild intermittent asthma     Obesity (BMI 30-39. 9)     PCOS (polycystic ovarian syndrome)        SURGICAL HISTORY     Patient  has a past surgical history that includes Tonsillectomy and [place] tooth extraction.     CURRENT MEDICATIONS       Previous Medications    ALBUTEROL SULFATE HFA (VENTOLIN HFA) 108 (90 BASE) MCG/ACT INHALER    Inhale 2 puffs into the lungs every 4 hours as needed for full passive range of motion without pain. Neck supple. No JVD present. No muscular tenderness present. Cardiovascular: Normal rate, regular rhythm and normal heart sounds. Pulmonary/Chest: Effort normal and breath sounds normal. No accessory muscle usage. No respiratory distress. She has no wheezes. She has no rales. Abdominal: Soft. She exhibits no distension. There is no tenderness. Musculoskeletal: Normal range of motion. She exhibits no edema, tenderness or deformity. Lymphadenopathy:     She has no cervical adenopathy. Neurological: She is alert and oriented to person, place, and time. She has normal strength. No cranial nerve deficit. Coordination and gait normal. GCS eye subscore is 4. GCS verbal subscore is 5. GCS motor subscore is 6. Skin: Skin is warm and dry. Capillary refill takes less than 2 seconds. She is not diaphoretic. No erythema. No pallor. Psychiatric: She has a normal mood and affect. Her speech is normal and behavior is normal. Judgment and thought content normal. Cognition and memory are normal.   Nursing note and vitals reviewed. DIAGNOSTIC RESULTS   Labs: No results found for this visit on 07/20/19. IMAGING:    URGENT CARE COURSE:     Vitals:    07/20/19 1343   BP: 130/83   Pulse: 104   Resp: 14   Temp: 98.9 °F (37.2 °C)   TempSrc: Temporal   SpO2: 97%   Weight: 232 lb 5 oz (105.4 kg)   Height: 5' 6\" (1.676 m)       Medications - No data to display  PROCEDURES:  None  FINAL IMPRESSION      1. URI with cough and congestion        DISPOSITION/PLAN   DISPOSITION Decision To Discharge 07/20/2019 01:59:21 PM    I discussed with the patient that this is most likely viral syndrome and she should not take the antibiotic for a few days to see if it begins to resolve. She is given prednisone for symptomatic relief. She may continue to use decongestants and take her allergy medicine.     PATIENT REFERRED TO:  Nicole [address]ft[address] Dr[address]  [address] [facility]

## 2019-07-26 ENCOUNTER — HOSPITAL ENCOUNTER (EMERGENCY)
Age: 24
Discharge: HOME | End: 2019-07-26
Payer: COMMERCIAL

## 2019-07-26 VITALS
OXYGEN SATURATION: 97 % | WEIGHT: 232 LBS | BODY MASS INDEX: 37.28 KG/M2 | HEIGHT: 66 IN | HEART RATE: 80 BPM | RESPIRATION RATE: 16 BRPM | TEMPERATURE: 98.4 F | DIASTOLIC BLOOD PRESSURE: 81 MMHG | SYSTOLIC BLOOD PRESSURE: 132 MMHG

## 2019-07-26 DIAGNOSIS — R09.81 SINUS CONGESTION: Primary | ICD-10-CM

## 2019-07-26 DIAGNOSIS — J06.9 UPPER RESPIRATORY TRACT INFECTION, UNSPECIFIED TYPE: ICD-10-CM

## 2019-07-26 DIAGNOSIS — R05.9 COUGH: ICD-10-CM

## 2019-07-26 PROCEDURE — 99212 OFFICE O/P EST SF 10 MIN: CPT

## 2019-07-26 PROCEDURE — 99213 OFFICE O/P EST LOW 20 MIN: CPT | Performed by: NURSE PRACTITIONER

## 2019-07-26 RX ORDER — BENZONATATE 100 MG/1
100 CAPSULE ORAL 3 TIMES DAILY PRN
Qty: 21 CAPSULE | Refills: 0 | Status: SHIPPED | OUTPATIENT
Start: 2019-07-26 | End: 2019-08-02

## 2019-07-26 RX ORDER — PROMETHAZINE HYDROCHLORIDE AND PHENYLEPHRINE HYDROCHLORIDE 6.25; 5 MG/5ML; MG/5ML
5 SYRUP ORAL NIGHTLY PRN
Qty: 280 ML | Refills: 0 | Status: SHIPPED | OUTPATIENT
Start: 2019-07-26 | End: 2019-07-30

## 2019-07-26 ASSESSMENT — ENCOUNTER SYMPTOMS
SINUS PRESSURE: 1
VOMITING: 0
SHORTNESS OF BREATH: 0
SORE THROAT: 1
SINUS PAIN: 1
CHEST TIGHTNESS: 0
STRIDOR: 0
VOICE CHANGE: 1
COUGH: 1
WHEEZING: 0
DIARRHEA: 0
ABDOMINAL PAIN: 0
NAUSEA: 0

## 2019-07-26 NOTE — ED PROVIDER NOTES
times daily for 7 days, Disp-14 tablet, R-0Normal      albuterol sulfate HFA (VENTOLIN HFA) 108 (90 Base) MCG/ACT inhaler Inhale 2 puffs into the lungs every 4 hours as needed for Wheezing or Shortness of Breath, Disp-2 Inhaler, R-3Normal      fexofenadine (ALLEGRA) 180 MG tablet Take 1 tablet by mouth daily, Disp-90 tablet, R-3Normal      fluticasone (FLONASE) 50 MCG/ACT nasal spray 2 sprays by Nasal route daily, Disp-3 Bottle, R-3Normal      Norgestimate-Eth Estradiol (SPRINTEC 28 PO) Take by mouthHistorical Med      ibuprofen (ADVIL;MOTRIN) 200 MG tablet Take 200 mg by mouth every 6 hours as needed for PainHistorical Med      Spacer/Aero-Holding Chambers ROCIO DAILY PRN Starting 11/26/2014, Until Discontinued, Disp-1 Device, R-0, Print             ALLERGIES     Patient is has No Known Allergies. Patients   Immunization History   Administered Date(s) Administered    DTaP (Infanrix) 1997, 1997, 01/29/1998, 01/28/1999, 08/07/2002    HIB PRP-T (ActHIB, Hiberix) 1997, 1997, 01/29/1998, 01/28/1999    HPV Quadrivalent (Gardasil) 06/17/2009, 12/30/2009, 08/31/2016    Hepatitis A Ped/Adol (Vaqta) 06/17/2009, 12/30/2009    Hepatitis B Ped/Adol (Recombivax HB) 1997, 1997, 01/29/1998    Influenza, [place], 3 Years and older, IM (Fluzone 3 yrs and older or Afluria 5 yrs and older) 10/26/2017    Influenza, [place], 3 yrs and older, IM, PF (Fluzone 3 yrs and older or Afluria 5 yrs and older) 11/21/2018    MMR 06/18/1999, 08/07/2002    Meningococcal MCV4P (Menactra) 06/17/2009, 08/31/2016    Pneumococcal Polysaccharide ([id number]) 01/26/2018    Polio IPV (IPOL) 1997, 1997, 06/18/1998, 08/07/2002    Tdap (Boostrix, Adacel) 09/27/2009       FAMILY HISTORY     Patient's family history includes Diabetes in her mother; High Blood Pressure in her father and mother; High Cholesterol in her father. SOCIAL HISTORY     Patient  reports that she has never smoked.  She has never

## 2019-08-07 ENCOUNTER — TELEPHONE (OUTPATIENT)
Dept: FAMILY MEDICINE CLINIC | Age: 24
End: 2019-08-07

## 2019-09-28 DIAGNOSIS — J30.1 NON-SEASONAL ALLERGIC RHINITIS DUE TO POLLEN: Chronic | ICD-10-CM

## 2019-09-30 ENCOUNTER — OFFICE VISIT (OUTPATIENT)
Dept: FAMILY MEDICINE CLINIC | Age: 24
End: 2019-09-30
Payer: COMMERCIAL

## 2019-09-30 VITALS
BODY MASS INDEX: 39.05 KG/M2 | DIASTOLIC BLOOD PRESSURE: 64 MMHG | WEIGHT: 243 LBS | HEIGHT: 66 IN | TEMPERATURE: 98.3 F | SYSTOLIC BLOOD PRESSURE: 126 MMHG | RESPIRATION RATE: 10 BRPM | HEART RATE: 80 BPM

## 2019-09-30 DIAGNOSIS — R03.0 PREHYPERTENSION: ICD-10-CM

## 2019-09-30 DIAGNOSIS — R80.9 MICROALBUMINURIA: ICD-10-CM

## 2019-09-30 DIAGNOSIS — E66.9 OBESITY (BMI 30-39.9): Chronic | ICD-10-CM

## 2019-09-30 DIAGNOSIS — K52.9 CHRONIC DIARRHEA: Primary | ICD-10-CM

## 2019-09-30 DIAGNOSIS — Z23 NEEDS FLU SHOT: ICD-10-CM

## 2019-09-30 DIAGNOSIS — J45.20 MILD INTERMITTENT ASTHMA WITHOUT COMPLICATION: Chronic | ICD-10-CM

## 2019-09-30 PROCEDURE — 99214 OFFICE O/P EST MOD 30 MIN: CPT | Performed by: FAMILY MEDICINE

## 2019-09-30 PROCEDURE — 90471 IMMUNIZATION ADMIN: CPT | Performed by: FAMILY MEDICINE

## 2019-09-30 PROCEDURE — 90686 IIV4 VACC NO PRSV 0.5 ML IM: CPT | Performed by: FAMILY MEDICINE

## 2019-09-30 PROCEDURE — G8417 CALC BMI ABV UP PARAM F/U: HCPCS | Performed by: FAMILY MEDICINE

## 2019-09-30 PROCEDURE — 1036F TOBACCO NON-USER: CPT | Performed by: FAMILY MEDICINE

## 2019-09-30 PROCEDURE — G8427 DOCREV CUR MEDS BY ELIG CLIN: HCPCS | Performed by: FAMILY MEDICINE

## 2019-09-30 RX ORDER — FEXOFENADINE HCL 180 MG/1
TABLET ORAL
Qty: 30 TABLET | Refills: 3 | Status: CANCELLED | OUTPATIENT
Start: 2019-09-30

## 2019-09-30 RX ORDER — FEXOFENADINE HYDROCHLORIDE 180 MG/1
TABLET, FILM COATED ORAL
Qty: 30 TABLET | Refills: 3 | Status: SHIPPED | OUTPATIENT
Start: 2019-09-30 | End: 2020-02-03

## 2019-09-30 NOTE — PROGRESS NOTES
Chief Complaint   Patient presents with    Diarrhea     years, on and off    Follow-up     BP's, labs, asthma obesity       History obtained from the patient. SUBJECTIVE:  Carlo Toscano is a 27 y.o. female that presents today for     -Diarrhea:  Has been having on and off diarrhea  Going on most of her life  Can be related to certain foods, can be related to stress  Will get a lot of abd cramping, that will be relieved by defecation. occ nausea  No emesis  No wt loss  + fam hx of IBS  No fam hx of IBD  Feels pretty good today      -BP issues/microalbumin LAST VISIT: pt worried about HTN. However, BP's have been running 120-130/80-85. + fam hx of HTN. No CP/SOB. UPDATE TODAY:   BP's have been doing great  <140/90, typically 120s/70s  No CP/sOb  Had some mild microalbumin noted 6+ months ago. Had f/u labs ordered. Never did       -Asthma: doing well.      History of asthma?: Yes  Current medication regimen -      Frequency of day symptoms?  occ   Frequency of night time Sx?  never     Chronic cough?: no  Chest pain/Tightness?:  no  Shortness of breath?: no  Wheezing? no     Last PFTs - NOV 2017     Known triggers? Yes  Hospitalized and/or intubated in the past?: No  Smoker or smoke exposure in the home?: No  Number of times prescribed oral steroids in the past year - 0  FamHx of asthma, eczema or allergic rhinitis?   No      -Obesity:  wts up some  Good diet changes  Exercising a lot      Wt Readings from Last 3 Encounters:   09/30/19 243 lb (110.2 kg)   07/26/19 232 lb (105.2 kg)   07/20/19 232 lb 5 oz (105.4 kg)       Age/Gender Health Maintenance    Lipid -   Lab Results   Component Value Date    CHOL 214 (H) 12/13/2018     Lab Results   Component Value Date    TRIG 103 12/13/2018     Lab Results   Component Value Date    HDL 56 12/13/2018     Lab Results   Component Value Date    LDLCALC 137 12/13/2018     No results found for: LABVLDL, VLDL  No results found for: CHOLHDLRATIO      DM Screen - Lab Results   Component Value Date    GLUCOSE 79 12/18/2018    GLUCOSE 97 10/19/2011       Colon Cancer Screening - age 48  Lung Cancer Screening (Age 54 to [de-identified] with 30 pack year hx, current smoker or quit within past 15 years) - never smoker    Tetanus - UTD 2009  Influenza Vaccine - UTD FALL 2019  Pneumonia Vaccine - UTD PPV 23 JAN 2018  Zoster - age 48     Breast Cancer Screening - age 44-55  Cervical Cancer Screening - NEG June 2019  Osteoporosis Screening - age 72      Current Outpatient Medications   Medication Sig Dispense Refill    EQ ALLERGY RELIEF 180 MG tablet TAKE 1 TABLET BY MOUTH ONCE DAILY 30 tablet 3    albuterol sulfate HFA (VENTOLIN HFA) 108 (90 Base) MCG/ACT inhaler Inhale 2 puffs into the lungs every 4 hours as needed for Wheezing or Shortness of Breath 2 Inhaler 3    fluticasone (FLONASE) 50 MCG/ACT nasal spray 2 sprays by Nasal route daily 3 Bottle 3    Norgestimate-Eth Estradiol (SPRINTEC 28 PO) Take by mouth      ibuprofen (ADVIL;MOTRIN) 200 MG tablet Take 200 mg by mouth every 6 hours as needed for Pain      Spacer/Aero-Holding Chambers ROCIO 1 Device by Does not apply route daily as needed. 1 Device 0     No current facility-administered medications for this visit. No orders of the defined types were placed in this encounter. All medications reviewed and reconciled, including OTC and herbal medications. Updated list given to patient. Patient Active Problem List    Diagnosis Date Noted    Chronic diarrhea 09/30/2019    Microalbuminuria     Allergic rhinitis     History of pneumonia     Mild intermittent asthma     PCOS (polycystic ovarian syndrome)     Obesity (BMI 30-39. 9)        Past Medical History:   Diagnosis Date    Allergic rhinitis     History of pneumonia     Mild intermittent asthma     Obesity (BMI 30-39. 9)     PCOS (polycystic ovarian syndrome)        Past Surgical History:   Procedure Laterality Date    TONSILLECTOMY      carrie DENISE clear bilaterally, TMs intact and regular, nose without deformity, nasal mucosa and turbinates normal without polyps, oropharynx normal, dentition is normal for age  Neck: supple and non-tender without mass, no thyromegaly or thyroid nodules, no cervical lymphadenopathy  Pulmonary/Chest: clear to auscultation bilaterally- no wheezes, rales or rhonchi, normal air movement, no respiratory distress or retractions  Cardiovascular: S1 and S2 auscultated w/ RRR. No murmurs, rubs, clicks, or gallops, distal pulses intact. Abdomen: soft, non-tender, non-distended, bowl sounds physiologic,  no rebound or guarding, no masses or hernias noted. Liver and spleen without enlargement. Extremities: no cyanosis, clubbing or edema of the lower extremities. Skin: warm and dry, no rash or erythema      ASSESSMENT & PLAN  1. Chronic diarrhea    Suspect IBS  Inc fiber  Check labs below  Refer to GI to f/o other issues    - CBC Auto Differential; Future  - Comprehensive Metabolic Panel; Future  - TSH with Reflex; Future  - Gastrointestinal Panel by DNA; Future  - IgA; Future  - Tissue Transglutaminase, IgA; Future  - Endomysial Antibody, IgA; Future  - C-Reactive Protein; Future  - Sedimentation Rate; Future  - AFL - Kareem Somers MD, Gastroenterology, [facility] [address]    2. Prehypertension    BP's better  con't to monitor  Not clear cause of microalbuminuria, repeat lab order reprinted  If still high, begin further w/u  Con't to monitor BP's    3. Microalbuminuria      4. Mild intermittent asthma without complication    Stable  Doing well  con't prn alb  AAP reviewed    5. Obesity (BMI 30-39.9)    con't with wt loss strategies    6. Needs flu shot    - INFLUENZA, QUADV, 0.5ML, 6 MO AND OLDER, IM, PF, PREFILL SYR OR SDV (FLUZONE QUADV, PF)      DISPOSITION    Return in about 1 year (around 9/30/2020) for follow-up on chronic medical conditions, sooner as needed. FP Completeer released without restrictions.     PATIENT COUNSELING    Patient given

## 2019-09-30 NOTE — PATIENT INSTRUCTIONS
LAB INSTRUCTIONS:    Please complete labs within 4 week(s). Please fast for 8 hours prior to lab collection. The clinic will call you within 1 week of collection. If you have not heard from us within that amount of time, please call us at [phone]. Patient Education        Diarrhea: Care Instructions  Your Care Instructions    Diarrhea is loose, watery stools (bowel movements). The exact cause is often hard to find. Sometimes diarrhea is your body's way of getting rid of what caused an upset stomach. Viruses, food poisoning, and many medicines can cause diarrhea. Some people get diarrhea in response to emotional stress, anxiety, or certain foods. Almost everyone has diarrhea now and then. It usually isn't serious, and your stools will return to normal soon. The important thing to do is replace the fluids you have lost, so you can prevent dehydration. The doctor has checked you carefully, but problems can develop later. If you notice any problems or new symptoms, get medical treatment right away. Follow-up care is a key part of your treatment and safety. Be sure to make and go to all appointments, and call your doctor if you are having problems. It's also a good idea to know your test results and keep a list of the medicines you take. How can you care for yourself at home? · Watch for signs of dehydration, which means your body has lost too much water. Dehydration is a serious condition and should be treated right away. Signs of dehydration are:  ? Increasing thirst and dry eyes and mouth. ? Feeling faint or lightheaded. ? Darker urine, and a smaller amount of urine than normal.  · To prevent dehydration, drink plenty of fluids, enough so that your urine is light yellow or clear like water. Choose water and other caffeine-free clear liquids until you feel better.  If you have kidney, heart, or liver disease and have to limit fluids, talk with your doctor before you increase the amount of fluids you

## 2019-10-01 ENCOUNTER — HOSPITAL ENCOUNTER (OUTPATIENT)
Age: 24
Discharge: HOME | End: 2019-10-01
Payer: COMMERCIAL

## 2019-10-01 DIAGNOSIS — K52.9 CHRONIC DIARRHEA: ICD-10-CM

## 2019-10-01 DIAGNOSIS — R80.9 MICROALBUMINURIA: ICD-10-CM

## 2019-10-01 LAB
ADENOVIRUS F 40 41 PCR: NOT DETECTED
ALBUMIN SERPL-MCNC: 4.3 G/DL (ref 3.5–5.1)
ALP BLD-CCNC: 69 U/L (ref 38–126)
ALT SERPL-CCNC: 10 U/L (ref 11–66)
ANION GAP SERPL CALCULATED.3IONS-SCNC: 13 MEQ/L (ref 8–16)
AST SERPL-CCNC: 16 U/L (ref 5–40)
ASTROVIRUS PCR: NOT DETECTED
BASOPHILS # BLD: 0.6 %
BASOPHILS ABSOLUTE: 0 THOU/MM3 (ref 0–0.1)
BILIRUB SERPL-MCNC: 0.4 MG/DL (ref 0.3–1.2)
BUN BLDV-MCNC: 15 MG/DL (ref 7–22)
C-REACTIVE PROTEIN: 1.71 MG/DL (ref 0–1)
CALCIUM SERPL-MCNC: 9.6 MG/DL (ref 8.5–10.5)
CAMPYLOBACTER PCR: NOT DETECTED
CHLORIDE BLD-SCNC: 102 MEQ/L (ref 98–111)
CLOSTRIDIUM DIFFICILE, PCR: NOT DETECTED
CO2: 26 MEQ/L (ref 23–33)
CREAT SERPL-MCNC: 1.1 MG/DL (ref 0.4–1.2)
CREATININE, URINE: 215.2 MG/DL
CRYPTOSPORIDIUM PCR: NOT DETECTED
CYCLOSPORA CAYETANENSIS PCR: NOT DETECTED
E COLI 0157 PCR: NORMAL
E COLI ENTEROAGGREGATIVE PCR: NOT DETECTED
E COLI ENTEROPATHOGENIC PCR: NOT DETECTED
E COLI ENTEROTOXIGENIC PCR: NOT DETECTED
E COLI SHIGA LIKE TOXIN PCR: NOT DETECTED
E COLI SHIGELLA/ENTEROINVASIVE PCR: NOT DETECTED
E HISTOLYTICA GI FILM ARRAY: NOT DETECTED
EOSINOPHIL # BLD: 0.9 %
EOSINOPHILS ABSOLUTE: 0 THOU/MM3 (ref 0–0.4)
ERYTHROCYTE [DISTWIDTH] IN BLOOD BY AUTOMATED COUNT: 12.3 % (ref 11.5–14.5)
ERYTHROCYTE [DISTWIDTH] IN BLOOD BY AUTOMATED COUNT: 39.3 FL (ref 35–45)
GFR SERPL CREATININE-BSD FRML MDRD: 62 ML/MIN/1.73M2
GIARDIA LAMBLIA PCR: NOT DETECTED
GLUCOSE BLD-MCNC: 73 MG/DL (ref 70–108)
HCT VFR BLD CALC: 45.2 % (ref 37–47)
HEMOGLOBIN: 15.5 GM/DL (ref 12–16)
IMMATURE GRANS (ABS): 0.01 THOU/MM3 (ref 0–0.07)
IMMATURE GRANULOCYTES: 0.2 %
LYMPHOCYTES # BLD: 27.5 %
LYMPHOCYTES ABSOLUTE: 1.5 THOU/MM3 (ref 1–4.8)
MCH RBC QN AUTO: 30 PG (ref 26–33)
MCHC RBC AUTO-ENTMCNC: 34.3 GM/DL (ref 32.2–35.5)
MCV RBC AUTO: 87.6 FL (ref 81–99)
MICROALBUMIN UR-MCNC: 9.45 MG/DL
MICROALBUMIN/CREAT UR-RTO: 44 MG/G (ref 0–30)
MONOCYTES # BLD: 9 %
MONOCYTES ABSOLUTE: 0.5 THOU/MM3 (ref 0.4–1.3)
NOROVIRUS GI GII PCR: NOT DETECTED
NUCLEATED RED BLOOD CELLS: 0 /100 WBC
PLATELET # BLD: 231 THOU/MM3 (ref 130–400)
PLESIOMONAS SHIGELLOIDES PCR: NOT DETECTED
PMV BLD AUTO: 9.4 FL (ref 9.4–12.4)
POTASSIUM SERPL-SCNC: 3.9 MEQ/L (ref 3.5–5.2)
RBC # BLD: 5.16 MILL/MM3 (ref 4.2–5.4)
ROTAVIRUS A PCR: NOT DETECTED
SALMONELLA PCR: NOT DETECTED
SAPOVIRUS PCR: NOT DETECTED
SEDIMENTATION RATE, ERYTHROCYTE: 42 MM/HR (ref 0–20)
SEG NEUTROPHILS: 61.8 %
SEGMENTED NEUTROPHILS ABSOLUTE COUNT: 3.3 THOU/MM3 (ref 1.8–7.7)
SODIUM BLD-SCNC: 141 MEQ/L (ref 135–145)
TOTAL PROTEIN: 8.1 G/DL (ref 6.1–8)
TSH SERPL DL<=0.05 MIU/L-ACNC: 2.26 UIU/ML (ref 0.4–4.2)
VIBRIO CHOLERAE PCR: NOT DETECTED
VIBRIO PCR: NOT DETECTED
WBC # BLD: 5.4 THOU/MM3 (ref 4.8–10.8)
YERSINIA ENTEROCOLITICA PCR: NOT DETECTED

## 2019-10-01 PROCEDURE — 82043 UR ALBUMIN QUANTITATIVE: CPT

## 2019-10-01 PROCEDURE — 85651 RBC SED RATE NONAUTOMATED: CPT

## 2019-10-01 PROCEDURE — 36415 COLL VENOUS BLD VENIPUNCTURE: CPT

## 2019-10-01 PROCEDURE — 87507 IADNA-DNA/RNA PROBE TQ 12-25: CPT

## 2019-10-01 PROCEDURE — 85025 COMPLETE CBC W/AUTO DIFF WBC: CPT

## 2019-10-01 PROCEDURE — 83516 IMMUNOASSAY NONANTIBODY: CPT

## 2019-10-01 PROCEDURE — 80053 COMPREHEN METABOLIC PANEL: CPT

## 2019-10-01 PROCEDURE — 82784 ASSAY IGA/IGD/IGG/IGM EACH: CPT

## 2019-10-01 PROCEDURE — 84443 ASSAY THYROID STIM HORMONE: CPT

## 2019-10-01 PROCEDURE — 86140 C-REACTIVE PROTEIN: CPT

## 2019-10-02 LAB — IGA: 146 MG/DL (ref 70–400)

## 2019-10-03 LAB — ENDOMYSIAL IGA ANTIBODY: NORMAL

## 2019-10-08 ENCOUNTER — HOSPITAL ENCOUNTER (OUTPATIENT)
Age: 24
Discharge: HOME | End: 2019-10-08
Payer: COMMERCIAL

## 2019-10-08 LAB — IGA: 149 MG/DL (ref 70–400)

## 2019-10-08 PROCEDURE — 82705 FATS/LIPIDS FECES QUAL: CPT

## 2019-10-08 PROCEDURE — 36415 COLL VENOUS BLD VENIPUNCTURE: CPT

## 2019-10-08 PROCEDURE — 82784 ASSAY IGA/IGD/IGG/IGM EACH: CPT

## 2019-10-08 PROCEDURE — 83516 IMMUNOASSAY NONANTIBODY: CPT

## 2019-10-10 ENCOUNTER — TELEPHONE (OUTPATIENT)
Dept: FAMILY MEDICINE CLINIC | Age: 24
End: 2019-10-10

## 2019-10-10 DIAGNOSIS — R80.9 MICROALBUMINURIA: Primary | ICD-10-CM

## 2019-10-10 DIAGNOSIS — K52.9 CHRONIC DIARRHEA: ICD-10-CM

## 2019-10-10 LAB
FECAL FAT, QUALITATIVE: NORMAL
TISSUE TRANSGLUTAMINASE ANTIBODY: 0 U/ML (ref 0–5)
TISSUE TRANSGLUTAMINASE IGA: 0 U/ML (ref 0–3)

## 2019-10-11 ENCOUNTER — TELEPHONE (OUTPATIENT)
Dept: FAMILY MEDICINE CLINIC | Age: 24
End: 2019-10-11

## 2019-10-11 ENCOUNTER — NURSE ONLY (OUTPATIENT)
Dept: LAB | Age: 24
End: 2019-10-11

## 2019-10-15 ENCOUNTER — HOSPITAL ENCOUNTER (OUTPATIENT)
Dept: ULTRASOUND IMAGING | Age: 24
Discharge: HOME | End: 2019-10-15
Payer: COMMERCIAL

## 2019-10-15 DIAGNOSIS — R80.9 MICROALBUMINURIA: ICD-10-CM

## 2019-10-15 PROCEDURE — 76770 US EXAM ABDO BACK WALL COMP: CPT

## 2019-10-17 ENCOUNTER — TELEPHONE (OUTPATIENT)
Dept: FAMILY MEDICINE CLINIC | Age: 24
End: 2019-10-17

## 2020-01-24 ENCOUNTER — TELEPHONE (OUTPATIENT)
Dept: FAMILY MEDICINE CLINIC | Age: 25
End: 2020-01-24

## 2020-02-03 RX ORDER — FEXOFENADINE HYDROCHLORIDE 180 MG/1
TABLET, FILM COATED ORAL
Qty: 90 TABLET | Refills: 3 | Status: SHIPPED | OUTPATIENT
Start: 2020-02-03 | End: 2021-03-01

## 2020-03-23 ENCOUNTER — TELEPHONE (OUTPATIENT)
Dept: FAMILY MEDICINE CLINIC | Age: 25
End: 2020-03-23

## 2020-03-23 NOTE — TELEPHONE ENCOUNTER
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 Pt has been using her inhaler 2 or more times a day. pt is wanting a letter stating she is needing to be at home due to COVID-19 and also because of her asthma. Please advise.

## 2020-07-06 NOTE — TELEPHONE ENCOUNTER
2nd attempt to contact the pt re:overdue labs Dr Ana Pickett ordered on 10/11/19. No current HIPAA form on file so orders could not be mailed. MD notified already in alternate refill encounter.

## 2020-09-28 NOTE — PROGRESS NOTES
Chief Complaint   Patient presents with    Follow-up     1 year f/u chronic medical conditions       History obtained from the patient. SUBJECTIVE:  Siria Saha is a 23 y.o. female that presents today for       -Diarrhea LAST VISIT:  Has been having on and off diarrhea  Going on most of her life  Can be related to certain foods, can be related to stress  Will get a lot of abd cramping, that will be relieved by defecation. occ nausea  No emesis  No wt loss  + fam hx of IBS  No fam hx of IBD  Feels pretty good today    UPDATE TODAY:   Saw GI  Neg w/u  Neg colo  sxs improved  Dx with IBS  Good improvement with diet changes/inc in fiber       -BP issues/microalbumin PRIOR VISIT: pt worried about HTN. However, BP's have been running 120-130/80-85. + fam hx of HTN. No CP/SOB. UPDATE LAST VISIT:   BP's have been doing great  <140/90, typically 120s/70s  No CP/sOb  Had some mild microalbumin noted 6+ months ago. Had f/u labs ordered. Never did     UPDATE TODAY:   Has been checking BPs  Running 120s/80s  Had some mild microalbumin  F/u labs ordered, never done  Did do renal US, was negative        -Asthma:   On prn alb   No cough, wheezing or SOB    History of asthma?: Yes  Current medication regimen -      Frequency of day symptoms?  occ   Frequency of night time Sx?  never     Chronic cough?: no  Chest pain/Tightness?:  no  Shortness of breath?: no  Wheezing? no     Last PFTs - NOV 2017     Known triggers? Yes  Hospitalized and/or intubated in the past?: No  Smoker or smoke exposure in the home?: No  Number of times prescribed oral steroids in the past year - 0  FamHx of asthma, eczema or allergic rhinitis?   No      -Obesity:  Good diet changes  Exercising daily  wts up, but pt feels that's muscle  Much more in shape    Wt Readings from Last 3 Encounters:   09/29/20 249 lb 9.6 oz (113.2 kg)   09/30/19 243 lb (110.2 kg)   07/26/19 232 lb (105.2 kg)       Age/Gender Health Maintenance    Lipid -   Lab Results   Component Value Date    CHOL 214 (H) 12/13/2018     Lab Results   Component Value Date    TRIG 103 12/13/2018     Lab Results   Component Value Date    HDL 56 12/13/2018     Lab Results   Component Value Date    LDLCALC 137 12/13/2018     No results found for: LABVLDL, VLDL  No results found for: CHOLHDLRATIO      DM Screen -   Lab Results   Component Value Date    GLUCOSE 73 10/01/2019    GLUCOSE 97 10/19/2011       Colon Cancer Screening - NEG OCT 2019, repeat age 50. Per GI, Dr. Chidi Nunez. Lung Cancer Screening (Age 54 to [de-identified] with 30 pack year hx, current smoker or quit within past 15 years) - never smoker    Tetanus - UTD SEPT 2020  Influenza Vaccine - UTD FALL 2019  Pneumonia Vaccine - UTD PPV 23 JAN 2018  Zoster - age 50     Breast Cancer Screening - age 44-55  Cervical Cancer Screening - NEG June 2019  Osteoporosis Screening - age 72      Current Outpatient Medications   Medication Sig Dispense Refill    methylcellulose (EQ FIBER THERAPY) 500 MG TABS Take by mouth as needed      albuterol sulfate HFA (VENTOLIN HFA) 108 (90 Base) MCG/ACT inhaler Inhale 2 puffs into the lungs every 4 hours as needed for Wheezing or Shortness of Breath 2 Inhaler 3    EQ ALLERGY RELIEF 180 MG tablet TAKE 1 TABLET BY MOUTH ONCE DAILY 90 tablet 3    fluticasone (FLONASE) 50 MCG/ACT nasal spray 2 sprays by Nasal route daily 3 Bottle 3    Norgestimate-Eth Estradiol (SPRINTEC 28 PO) Take by mouth      ibuprofen (ADVIL;MOTRIN) 200 MG tablet Take 200 mg by mouth every 6 hours as needed for Pain      Spacer/Aero-Holding Chambers ROCIO 1 Device by Does not apply route daily as needed. 1 Device 0     No current facility-administered medications for this visit.       Orders Placed This Encounter   Medications    albuterol sulfate HFA (VENTOLIN HFA) 108 (90 Base) MCG/ACT inhaler     Sig: Inhale 2 puffs into the lungs every 4 hours as needed for Wheezing or Shortness of Breath     Dispense:  2 Inhaler     Refill:  3 All medications reviewed and reconciled, including OTC and herbal medications. Updated list given to patient. Patient Active Problem List    Diagnosis Date Noted    Prehypertension 09/29/2020    Irritable bowel syndrome with diarrhea     Microalbuminuria     Allergic rhinitis     History of pneumonia     Mild intermittent asthma     PCOS (polycystic ovarian syndrome)     Obesity (BMI 30-39. 9)        Past Medical History:   Diagnosis Date    Allergic rhinitis     History of pneumonia     Irritable bowel syndrome with diarrhea     Mild intermittent asthma     Obesity (BMI 30-39. 9)     PCOS (polycystic ovarian syndrome)        Past Surgical History:   Procedure Laterality Date    TONSILLECTOMY      adnoids    WISDOM TOOTH EXTRACTION         No Known Allergies      Social History     Tobacco Use    Smoking status: Never Smoker    Smokeless tobacco: Never Used   Substance Use Topics    Alcohol use: No       Family History   Problem Relation Age of Onset    Diabetes Mother     High Blood Pressure Mother     High Blood Pressure Father     High Cholesterol Father     Colon Cancer Neg Hx     Breast Cancer Neg Hx          I have reviewed the patient's past medical history, past surgical history, allergies, medications, social and family history and I have made updates where appropriate.       Review of Systems  Positive responses are highlighted in bold    Constitutional:  Fever, Chills, Night Sweats, Fatigue, Unexpected changes in weight  HENT:  Ear pain, Tinnitus, Nosebleeds, Trouble swallowing, Hearing loss, Sore throat  Cardiovascular:  Chest Pain, Palpitations, Orthopnea, Paroxysmal Nocturnal Dyspnea  Respiratory:  Cough, Wheezing, Shortness of breath, Chest tightness, Apnea  Gastrointestinal:  Nausea, Vomiting, Diarrhea, Constipation, Heartburn, Blood in stool  Genitourinary:  Difficulty or painful urination, Flank pain, Change in frequency, Urgency  Skin:  Color change, Rash, Itching, Wound  Musculoskeletal:  Joint pain, Back pain, Gait problems, Joint swelling, Myalgias  Neurological:  Dizziness, Headaches, Presyncope, Numbness, Seizures, Tremors  Endocrine:  Heat Intolerance, Cold Intolerance, Polydipsia, Polyphagia, Polyuria      PHYSICAL EXAM:  Vitals:    09/29/20 0941 09/29/20 0952   BP: (!) 126/92 124/72   Pulse: 78    Resp: 10    Temp: 98.4 °F (36.9 °C)    TempSrc: Oral    SpO2: 97%    Weight: 249 lb 9.6 oz (113.2 kg)    Height: 5' 7.13\" (1.705 m)      Body mass index is 38.95 kg/m². Pain Score:   0 - No pain    VS Reviewed  General Appearance: A&O x 3, No acute distress,well developed and well- nourished  Eyes: pupils equal, round, and reactive to light, extraocular eye movements intact, conjunctivae and eye lids without erythema  ENT: external ear and ear canal clear bilaterally, TMs intact and regular, nose without deformity, nasal mucosa and turbinates normal without polyps, oropharynx normal, dentition is normal for age  Neck: supple and non-tender without mass, no thyromegaly or thyroid nodules, no cervical lymphadenopathy  Pulmonary/Chest: clear to auscultation bilaterally- no wheezes, rales or rhonchi, normal air movement, no respiratory distress or retractions  Cardiovascular: S1 and S2 auscultated w/ RRR. No murmurs, rubs, clicks, or gallops, distal pulses intact. Abdomen: soft, non-tender, non-distended, bowl sounds physiologic,  no rebound or guarding, no masses or hernias noted. Liver and spleen without enlargement. Extremities: no cyanosis, clubbing or edema of the lower extremities.    Skin: warm and dry, no rash or erythema      Narrative    PROCEDURE: US RENAL COMPLETE         CLINICAL INFORMATION: Microalbuminuria.         COMPARISON: No prior study.         TECHNIQUE: Grayscale and color images were obtained of both kidneys.           FINDINGS:         RIGHT KIDNEY - 9.8 x 4.5 x 3.5 cm    Resistive Index - 0.62    Cortical Thickness - 1.3 cm         LEFT KIDNEY - 10.2 x 5.1 x 5.8 cm    Resistive Index - 0.67    Cortical Thickness - 1.9 cm         URINARY BLADDER    Pre-Void - 382 mL    Post-Void - 18 mL              There is normal echogenicity of the renal parenchyma bilaterally. There is no thinning of the renal cortex. There is no hydronephrosis.  No stones are noted.         No mass lesions are noted.         The urinary bladder appears normal. There is no bladder wall thickening.  Bilateral ureteral jets are seen. Robin Hearing is no significant post void residual volume.         Incidental note was made of a very contracted gallbladder.              Impression    No abnormality identified involving the kidneys or bladder.                        **This report has been created using voice recognition software. It may contain minor errors which are inherent in voice recognition technology. **         Final report electronically signed by Dr Moise Lovett on 10/16/2019 8:17 AM        ASSESSMENT & PLAN  1. Irritable bowel syndrome with diarrhea    Improved  Neg w/u  con't diet mod/fiber    2. Prehypertension    BP's doing better  con't to monitor  con't low salt diet  Wt loss  Check labs below  Need to monitor protein  Renal US reassuring    - CBC Auto Differential; Future  - Comprehensive Metabolic Panel; Future  - Lipid Panel; Future  - Protein / creatinine ratio, urine; Future  - TSH with Reflex; Future  - Microalbumin / Creatinine Urine Ratio; Future    3. Microalbuminuria    - CBC Auto Differential; Future  - Comprehensive Metabolic Panel; Future  - Lipid Panel; Future  - Protein / creatinine ratio, urine; Future  - TSH with Reflex; Future  - Microalbumin / Creatinine Urine Ratio; Future    4. Mild intermittent asthma without complication    Stable  con't prn alb    - albuterol sulfate HFA (VENTOLIN HFA) 108 (90 Base) MCG/ACT inhaler; Inhale 2 puffs into the lungs every 4 hours as needed for Wheezing or Shortness of Breath  Dispense: 2 Inhaler; Refill: 3    5. Obesity (BMI 30-39.9)    con't with wt loss measures    6. Need for Tdap vaccination    - Tdap (age 6y and older) IM ([address])      [address]    Return in about 1 year (around 9/29/2021) for follow-up on chronic medical conditions, sooner as needed. Osei Ayers released without restrictions. PATIENT COUNSELING    Patient given educational materials on: See Attached    Barriers to learning and self management: none    Discussed use, benefit, and side effects of prescribed medications. Barriers to medication compliance addressed. All patient questions answered. Pt voiced understanding.        Electronically signed by Andrew Bhatti DO on 9/29/2020 at 10:29 AM

## 2020-09-29 ENCOUNTER — OFFICE VISIT (OUTPATIENT)
Dept: FAMILY MEDICINE CLINIC | Age: 25
End: 2020-09-29
Payer: COMMERCIAL

## 2020-09-29 ENCOUNTER — NURSE ONLY (OUTPATIENT)
Dept: LAB | Age: 25
End: 2020-09-29

## 2020-09-29 VITALS
RESPIRATION RATE: 10 BRPM | TEMPERATURE: 98.4 F | WEIGHT: 249.6 LBS | HEIGHT: 67 IN | SYSTOLIC BLOOD PRESSURE: 124 MMHG | BODY MASS INDEX: 39.18 KG/M2 | DIASTOLIC BLOOD PRESSURE: 72 MMHG | OXYGEN SATURATION: 97 % | HEART RATE: 78 BPM

## 2020-09-29 PROBLEM — R03.0 PREHYPERTENSION: Status: ACTIVE | Noted: 2020-09-29

## 2020-09-29 LAB
ALBUMIN SERPL-MCNC: 4.2 G/DL (ref 3.5–5.1)
ALP BLD-CCNC: 82 U/L (ref 38–126)
ALT SERPL-CCNC: 11 U/L (ref 11–66)
ANION GAP SERPL CALCULATED.3IONS-SCNC: 11 MEQ/L (ref 8–16)
AST SERPL-CCNC: 16 U/L (ref 5–40)
BASOPHILS # BLD: 0.6 %
BASOPHILS ABSOLUTE: 0 THOU/MM3 (ref 0–0.1)
BILIRUB SERPL-MCNC: 0.3 MG/DL (ref 0.3–1.2)
BUN BLDV-MCNC: 18 MG/DL (ref 7–22)
CALCIUM SERPL-MCNC: 9.7 MG/DL (ref 8.5–10.5)
CHLORIDE BLD-SCNC: 102 MEQ/L (ref 98–111)
CHOLESTEROL, TOTAL: 217 MG/DL (ref 100–199)
CO2: 28 MEQ/L (ref 23–33)
CREAT SERPL-MCNC: 1.1 MG/DL (ref 0.4–1.2)
CREATININE URINE: 299.1 MG/DL
CREATININE, URINE: 299.1 MG/DL
EOSINOPHIL # BLD: 0.9 %
EOSINOPHILS ABSOLUTE: 0.1 THOU/MM3 (ref 0–0.4)
ERYTHROCYTE [DISTWIDTH] IN BLOOD BY AUTOMATED COUNT: 12.2 % (ref 11.5–14.5)
ERYTHROCYTE [DISTWIDTH] IN BLOOD BY AUTOMATED COUNT: 40.7 FL (ref 35–45)
GFR SERPL CREATININE-BSD FRML MDRD: 61 ML/MIN/1.73M2
GLUCOSE BLD-MCNC: 92 MG/DL (ref 70–108)
HCT VFR BLD CALC: 47.5 % (ref 37–47)
HDLC SERPL-MCNC: 64 MG/DL
HEMOGLOBIN: 15.9 GM/DL (ref 12–16)
IMMATURE GRANS (ABS): 0.02 THOU/MM3 (ref 0–0.07)
IMMATURE GRANULOCYTES: 0.3 %
LDL CHOLESTEROL CALCULATED: 129 MG/DL
LYMPHOCYTES # BLD: 37 %
LYMPHOCYTES ABSOLUTE: 2.3 THOU/MM3 (ref 1–4.8)
MCH RBC QN AUTO: 30.5 PG (ref 26–33)
MCHC RBC AUTO-ENTMCNC: 33.5 GM/DL (ref 32.2–35.5)
MCV RBC AUTO: 91.2 FL (ref 81–99)
MICROALBUMIN UR-MCNC: 9.72 MG/DL
MICROALBUMIN/CREAT UR-RTO: 32 MG/G (ref 0–30)
MONOCYTES # BLD: 7.4 %
MONOCYTES ABSOLUTE: 0.5 THOU/MM3 (ref 0.4–1.3)
NUCLEATED RED BLOOD CELLS: 0 /100 WBC
PLATELET # BLD: 250 THOU/MM3 (ref 130–400)
PMV BLD AUTO: 10.2 FL (ref 9.4–12.4)
POTASSIUM SERPL-SCNC: 4.3 MEQ/L (ref 3.5–5.2)
PROT/CREAT RATIO, UR: 0.1
PROTEIN, URINE: 29 MG/DL
RBC # BLD: 5.21 MILL/MM3 (ref 4.2–5.4)
SEG NEUTROPHILS: 53.8 %
SEGMENTED NEUTROPHILS ABSOLUTE COUNT: 3.4 THOU/MM3 (ref 1.8–7.7)
SODIUM BLD-SCNC: 141 MEQ/L (ref 135–145)
T4 FREE: 1.29 NG/DL (ref 0.93–1.76)
TOTAL PROTEIN: 8.1 G/DL (ref 6.1–8)
TRIGL SERPL-MCNC: 120 MG/DL (ref 0–199)
TSH SERPL DL<=0.05 MIU/L-ACNC: 4.71 UIU/ML (ref 0.4–4.2)
WBC # BLD: 6.3 THOU/MM3 (ref 4.8–10.8)

## 2020-09-29 PROCEDURE — 90715 TDAP VACCINE 7 YRS/> IM: CPT | Performed by: FAMILY MEDICINE

## 2020-09-29 PROCEDURE — G8417 CALC BMI ABV UP PARAM F/U: HCPCS | Performed by: FAMILY MEDICINE

## 2020-09-29 PROCEDURE — 99214 OFFICE O/P EST MOD 30 MIN: CPT | Performed by: FAMILY MEDICINE

## 2020-09-29 PROCEDURE — 1036F TOBACCO NON-USER: CPT | Performed by: FAMILY MEDICINE

## 2020-09-29 PROCEDURE — 90471 IMMUNIZATION ADMIN: CPT | Performed by: FAMILY MEDICINE

## 2020-09-29 PROCEDURE — G8427 DOCREV CUR MEDS BY ELIG CLIN: HCPCS | Performed by: FAMILY MEDICINE

## 2020-09-29 RX ORDER — PYRITHIONE ZINC 10 MG/ML
SHAMPOO TOPICAL PRN
COMMUNITY
End: 2021-05-04

## 2020-09-29 RX ORDER — ALBUTEROL SULFATE 90 UG/1
2 AEROSOL, METERED RESPIRATORY (INHALATION) EVERY 4 HOURS PRN
Qty: 2 INHALER | Refills: 3 | Status: SHIPPED | OUTPATIENT
Start: 2020-09-29 | End: 2022-02-17 | Stop reason: SDUPTHER

## 2020-09-29 ASSESSMENT — PATIENT HEALTH QUESTIONNAIRE - PHQ9
2. FEELING DOWN, DEPRESSED OR HOPELESS: 0
SUM OF ALL RESPONSES TO PHQ QUESTIONS 1-9: 0
SUM OF ALL RESPONSES TO PHQ QUESTIONS 1-9: 0
SUM OF ALL RESPONSES TO PHQ9 QUESTIONS 1 & 2: 0
1. LITTLE INTEREST OR PLEASURE IN DOING THINGS: 0

## 2020-09-29 NOTE — PATIENT INSTRUCTIONS
LAB INSTRUCTIONS:    Please complete labs within 4 week(s). Please fast for 8 hours prior to lab collection. The clinic will call you within 1 week of collection. If you have not heard from us within that amount of time, please call us at [phone]. Patient Education        Asthma in Adults: Care Instructions  Your Care Instructions     During an asthma attack, your airways swell and narrow as a reaction to certain things (triggers). This makes it hard to breathe. You may be able to prevent asthma attacks if you avoid the things that set off your asthma symptoms. Keeping your asthma under control and treating symptoms before they get bad can help you avoid severe attacks. If you can control your asthma, you may be able to do all of your normal daily activities. You may also avoid asthma attacks and trips to the hospital.  Follow-up care is a key part of your treatment and safety. Be sure to make and go to all appointments, and call your doctor if you are having problems. It's also a good idea to know your test results and keep a list of the medicines you take. How can you care for yourself at home? · Follow your asthma action plan so you can manage your symptoms at home. An asthma action plan will help you prevent and control airway reactions and will tell you what to do during an asthma attack. If you do not have an asthma action plan, work with your doctor to build one. · Take your asthma medicine exactly as prescribed. Medicine plays an important role in controlling asthma. Talk to your doctor right away if you have any questions about what to take and how to take it. ? Use your quick-relief medicine when you have symptoms of an attack. Quick-relief medicine often is an albuterol inhaler. Some people need to use quick-relief medicine before they exercise. ? Take your controller medicine every day, not just when you have symptoms. Controller medicine is usually an inhaled corticosteroid.  The goal is to prevent problems before they occur. Do not use your controller medicine to try to treat an attack that has already started. It does not work fast enough to help. ? If your doctor prescribed corticosteroid pills to use during an attack, take them as directed. They may take hours to work, but they may shorten the attack and help you breathe better. ? Keep your quick-relief medicine with you at all times. · Talk to your doctor before using other medicines. Some medicines, such as aspirin, can cause asthma attacks in some people. · Check yourself for asthma symptoms to know which step to follow in your action plan. Watch for things like being short of breath, having chest tightness, coughing, and wheezing. Also notice if symptoms wake you up at night or if you get tired quickly when you exercise. · If you have a peak flow meter, use it to check how well you are breathing. This can help you predict when an asthma attack is going to occur. Then you can take medicine to prevent the asthma attack or make it less severe. · See your doctor regularly. These visits will help you learn more about asthma and what you can do to control it. Your doctor will monitor your treatment to make sure the medicine is helping you. · Keep track of your asthma attacks and your treatment. After you have had an attack, write down what triggered it, what helped end it, and any concerns you have about your asthma action plan. Take your diary when you see your doctor. You can then review your asthma action plan and decide if it is working. · Do not smoke or allow others to smoke around you. Avoid smoky places. Smoking makes asthma worse. If you need help quitting, talk to your doctor about stop-smoking programs and medicines. These can increase your chances of quitting for good. · Learn what triggers an asthma attack for you, and avoid the triggers when you can.  Common triggers include colds, smoke, air pollution, dust, pollen, mold, pets, cockroaches, stress, and cold air. · Avoid colds and the flu. Get a pneumococcal vaccine shot. If you have had one before, ask your doctor whether you need a second dose. Get a flu vaccine every fall. If you must be around people with colds or the flu, wash your hands often. When should you call for help? [id number] anytime you think you may need emergency care. For example, call if:  · You have severe trouble breathing. Call your doctor now or seek immediate medical care if:  · Your symptoms do not get better after you have followed your asthma action plan. · You cough up yellow, dark brown, or bloody mucus (sputum). Watch closely for changes in your health, and be sure to contact your doctor if:  · Your coughing and wheezing get worse. · You need to use quick-relief medicine on more than 2 days a week (unless it is just for exercise). · You need help figuring out what is triggering your asthma attacks. Where can you learn more? Go to [email or web address] org and sign in to your [employer] account. Enter P597 in the [employer] box to learn more about \"Asthma in Adults: Care Instructions. \"     If you do not have an account, please click on the \"Sign Up Now\" link. Current as of: February 24, 2020               Content Version: 12.5  © 5553-6540 Healthwise, Incorporated. Care instructions adapted under license by [employer]. If you have questions about a medical condition or this instruction, always ask your healthcare professional. [place] [address] any warranty or liability for your use of this information.

## 2020-09-29 NOTE — PROGRESS NOTES
Immunization(s) given during visit:    Immunizations Administered     Name Date Dose Route    Tdap (Boostrix, Adacel) 9/29/2020 0.5 mL Intramuscular    Site: Deltoid- Right    Lot: [id number]    NDC: 47835-559-67          Most recent Vaccine Information Sheet dated 04/01/22 given to pt

## 2020-09-30 ENCOUNTER — TELEPHONE (OUTPATIENT)
Dept: FAMILY MEDICINE CLINIC | Age: 25
End: 2020-09-30

## 2020-09-30 NOTE — TELEPHONE ENCOUNTER
LMOM for pt to return our call at the earliest convenience.        Lab order and referral in phone 2 tray

## 2020-10-05 ENCOUNTER — NURSE ONLY (OUTPATIENT)
Dept: LAB | Age: 25
End: 2020-10-05

## 2020-10-05 LAB
T4 FREE: 1.31 NG/DL (ref 0.93–1.76)
TSH SERPL DL<=0.05 MIU/L-ACNC: 2.5 UIU/ML (ref 0.4–4.2)

## 2020-10-06 LAB — T3 FREE: 3.48 PG/ML (ref 2.02–4.43)

## 2020-10-07 LAB — THYROID PEROXIDASE ANTIBODY: < 10 IU/ML (ref 0–35)

## 2020-10-08 ENCOUNTER — TELEPHONE (OUTPATIENT)
Dept: FAMILY MEDICINE CLINIC | Age: 25
End: 2020-10-08

## 2020-10-20 ENCOUNTER — OFFICE VISIT (OUTPATIENT)
Dept: NEPHROLOGY | Age: 25
End: 2020-10-20
Payer: COMMERCIAL

## 2020-10-20 VITALS
BODY MASS INDEX: 38.7 KG/M2 | WEIGHT: 248 LBS | DIASTOLIC BLOOD PRESSURE: 85 MMHG | TEMPERATURE: 98.2 F | HEART RATE: 88 BPM | OXYGEN SATURATION: 97 % | SYSTOLIC BLOOD PRESSURE: 123 MMHG

## 2020-10-20 PROCEDURE — G8484 FLU IMMUNIZE NO ADMIN: HCPCS | Performed by: INTERNAL MEDICINE

## 2020-10-20 PROCEDURE — 99203 OFFICE O/P NEW LOW 30 MIN: CPT | Performed by: INTERNAL MEDICINE

## 2020-10-20 PROCEDURE — G8417 CALC BMI ABV UP PARAM F/U: HCPCS | Performed by: INTERNAL MEDICINE

## 2020-10-20 PROCEDURE — 1036F TOBACCO NON-USER: CPT | Performed by: INTERNAL MEDICINE

## 2020-10-20 PROCEDURE — G8427 DOCREV CUR MEDS BY ELIG CLIN: HCPCS | Performed by: INTERNAL MEDICINE

## 2020-10-20 NOTE — PROGRESS NOTES
strain: Not on file    Food insecurity     Worry: Not on file     Inability: Not on file    Transportation needs     Medical: Not on file     Non-medical: Not on file   Tobacco Use    Smoking status: Never Smoker    Smokeless tobacco: Never Used   Substance and Sexual Activity    Alcohol use: No    Drug use: No    Sexual activity: Not Currently   Lifestyle    Physical activity     Days per week: Not on file     Minutes per session: Not on file    Stress: Not on file   Relationships    Social connections     Talks on phone: Not on file     Gets together: Not on file     Attends [employer] service: Not on file     Active member of club or organization: Not on file     Attends meetings of clubs or organizations: Not on file     Relationship status: Not on file    Intimate partner violence     Fear of current or ex partner: Not on file     Emotionally abused: Not on file     Physically abused: Not on file     Forced sexual activity: Not on file   Other Topics Concern    Not on file   Social History Narrative    Not on file        Review of Systems:  Constitutional: no fever or chills  Head: No headaches  Eyes: no blurry vision, no discharge  Ears: no ear pain or hearing changes  Nose: no runny nose or epistaxis  Respiratory: no shortness of breath or cough or sputum production  Cardiovascular: no chest pain, no edema  GI: no nausea, no vomiting or diarrhea  : denies any hematuria, no flank pain  Skin: no rash  Musculoskeletal: no joint pain, moves all ext  Neuro: no tremor, no slurred speech  Psychiatric: stable mood, no depression or insomnia     Home Medications:  Prior to Admission medications    Medication Sig Start Date End Date Taking?  Authorizing Provider   methylcellulose (EQ FIBER THERAPY) 500 MG TABS Take by mouth as needed   Yes Historical Provider, MD   albuterol sulfate HFA (VENTOLIN HFA) 108 (90 Base) MCG/ACT inhaler Inhale 2 puffs into the lungs every 4 hours as needed for Wheezing or Shortness of Breath 9/29/20  Yes Nusrat Grace DO   EQ ALLERGY RELIEF 180 MG tablet TAKE 1 TABLET BY MOUTH ONCE DAILY 2/3/20  Yes Nusrat Grace DO   fluticasone (FLONASE) 50 MCG/ACT nasal spray 2 sprays by Nasal route daily 12/6/18  Yes Nusrat Grace DO   Norgestimate-Eth Estradiol (SPRINTEC 28 PO) Take by mouth   Yes Historical Provider, MD   ibuprofen (ADVIL;MOTRIN) 200 MG tablet Take 200 mg by mouth every 6 hours as needed for Pain   Yes Historical Provider, MD   Spacer/Aero-Holding Chambers ROCIO 1 Device by Does not apply route daily as needed. 11/26/14  Yes WILLY Holcomb - CNP        Physical Examination:  VITALS:  /85 (Site: Right Upper Arm, Position: Sitting, Cuff Size: Large Adult)   Pulse 88   Temp 98.2 °F (36.8 °C) (Temporal)   Wt 248 lb (112.5 kg)   SpO2 97%   BMI 38.70 kg/m²   Body mass index is 38.7 kg/m². Wt Readings from Last 3 Encounters:   10/20/20 248 lb (112.5 kg)   09/29/20 249 lb 9.6 oz (113.2 kg)   09/30/19 243 lb (110.2 kg)     Constitutional and General Appearance: alert and cooperative with exam, appears comfortable, no distress, obese  Eyes: no icteric sclera, no pallor conjunctiva, no discharge seen from either eye  Ears and Nose: normal external appearance of left and right ear and nose. No active drainage from nose.    Oral: moist oral mucus membranes  Neck: No jugular venous distention, appears symmetric, good ROM  Lungs: Air entry B/L, no crackles or rales, no use of accessory muscles  Heart: regular rate, S1, S2  Extremities: no LE edema  GI: soft, non-tender, no guarding  Skin: no rash seen on exposed extremities  Musculo: moves all extremities  Neuro: no slurred speech, no facial drooping, no asterixis  Psychiatric: Awake, alert, Oriented     Laboratory & Diagnostics:  CBC:   Lab Results   Component Value Date    WBC 6.3 09/29/2020    HGB 15.9 09/29/2020    HCT 47.5 (H) 09/29/2020    MCV 91.2 09/29/2020     09/29/2020     BMP:    Lab Results   Component Value Date     09/29/2020     10/01/2019     12/18/2018    K 4.3 09/29/2020    K 3.9 10/01/2019    K 4.2 12/18/2018     09/29/2020     10/01/2019     12/18/2018    CO2 28 09/29/2020    CO2 26 10/01/2019    CO2 25 12/18/2018    BUN 18 09/29/2020    BUN 15 10/01/2019    BUN 19 12/18/2018    CREATININE 1.1 09/29/2020    CREATININE 1.1 10/01/2019    CREATININE 1.0 12/18/2018    GLUCOSE 92 09/29/2020    GLUCOSE 73 10/01/2019    GLUCOSE 79 12/18/2018      Hepatic:   Lab Results   Component Value Date    AST 16 09/29/2020    AST 16 10/01/2019    AST 20 12/13/2018    ALT 11 09/29/2020    ALT 10 (L) 10/01/2019    ALT 18 12/13/2018    BILITOT 0.3 09/29/2020    BILITOT 0.4 10/01/2019    BILITOT 0.3 12/13/2018    ALKPHOS 82 09/29/2020    ALKPHOS 69 10/01/2019    ALKPHOS 73 12/13/2018     BNP: No results found for: BNP  Lipids:   Lab Results   Component Value Date    CHOL 217 (H) 09/29/2020    HDL 64 09/29/2020     INR: No results found for: INR  URINE:   Lab Results   Component Value Date    PROTUR 29.0 09/29/2020     No results found for: NITRU, COLORU, PHUR, LABCAST, WBCUA, RBCUA, MUCUS, TRICHOMONAS, YEAST, BACTERIA, CLARITYU, SPECGRAV, LEUKOCYTESUR, UROBILINOGEN, BILIRUBINUR, BLOODU, GLUCOSEU, KETUA, AMORPHOUS   Microalbumen/Creatinine ratio:  No components found for: RUCREAT        Impression/Plan:   1. Microalbuminuria: mild. Likely related to obesity. Will check microscopic UA to evaluate for any microscopic hematuria. Renal US ok. Discussed weight loss. 2. Obesity  3. PCOS  4. Asthma  5. IBS    Return in 3 months. Thought process was discussed with the patient.   Thank you for the referral.  Please do not hesitate to contact me if you have any questions or concerns        Lucero Gage, DO  [facility]

## 2020-11-30 RX ORDER — FLUTICASONE PROPIONATE 50 MCG
2 SPRAY, SUSPENSION (ML) NASAL DAILY
Qty: 3 BOTTLE | Refills: 3 | Status: SHIPPED | OUTPATIENT
Start: 2020-11-30

## 2021-03-01 DIAGNOSIS — J30.1 NON-SEASONAL ALLERGIC RHINITIS DUE TO POLLEN: Chronic | ICD-10-CM

## 2021-03-01 RX ORDER — FEXOFENADINE HCL 180 MG/1
TABLET ORAL
Qty: 90 TABLET | Refills: 3 | Status: SHIPPED | OUTPATIENT
Start: 2021-03-01 | End: 2022-04-14 | Stop reason: SDUPTHER

## 2021-03-28 ENCOUNTER — PATIENT MESSAGE (OUTPATIENT)
Dept: FAMILY MEDICINE CLINIC | Age: 26
End: 2021-03-28

## 2021-03-28 DIAGNOSIS — E66.9 OBESITY (BMI 30-39.9): Primary | ICD-10-CM

## 2021-03-29 NOTE — TELEPHONE ENCOUNTER
From: Fatmata Dai  To: Jason Salinas DO  Sent: 3/28/2021 9:42 PM EDT  Subject: Non-Urgent Medical Question    Hi Dr. Chang Talavera. I know I didnt make an appointment with the adipex. My friend didnt tell me it was an actual drug. I completely lost interest in it but I would like to be referred to a nutritionist to help me with my food intake for my PCOS, IBS, and other conditions. I would like to heal my body as naturally as possible through diet and exercise but Im finding that my hormone imbalance is getting in the way. I also dont know where to begin with vitamins. I think consulting with a nutritionist could help narrow down healthy foods to eat to help my conditions along with figuring out an exercise regimen catered to my body.

## 2021-04-05 ENCOUNTER — OFFICE VISIT (OUTPATIENT)
Dept: INTERNAL MEDICINE CLINIC | Age: 26
End: 2021-04-05
Payer: COMMERCIAL

## 2021-04-05 VITALS — HEIGHT: 66 IN | TEMPERATURE: 98 F | WEIGHT: 257 LBS | BODY MASS INDEX: 41.3 KG/M2

## 2021-04-05 DIAGNOSIS — E66.9 OBESITY (BMI 30-39.9): Chronic | ICD-10-CM

## 2021-04-05 PROCEDURE — 97802 MEDICAL NUTRITION INDIV IN: CPT | Performed by: DIETITIAN, REGISTERED

## 2021-04-05 NOTE — PROGRESS NOTES
[address] [place] [address]. [address] [place] [address] Rd., [facility] [address], [facility] [address]  [phone] (phone)  [phone] (fax)    Patient Name: Bonita Rice. Date of Birth: [id number]. MRN: [id number]      Assessment: Patient is a 23 y.o. female seen for Initial MNT visit for Obesity.     -Nutritionally relevant labs:   Lab Results   Component Value Date/Time    GLUCOSE 92 09/29/2020 10:06 AM    GLUCOSE 73 10/01/2019 12:51 PM    GLUCOSE 97 10/19/2011 03:15 PM    CHOL 217 (H) 09/29/2020 10:06 AM    HDL 64 09/29/2020 10:06 AM    LDLCALC 129 09/29/2020 10:06 AM    TRIG 120 09/29/2020 10:06 AM     Dx with PCOS and IBS. States she is avoiding dairy, soda, and fatty foods. Pt states she has strength trained 3-4x/week in the past and was losing inches but not losing pounds. Currently working out 2-3x/week at the gym ~30 minutes, 1-2x/week HIIT at home ~15 minutes  Typically works out between 6-8 PM.  Lost 15lb when in college down to 215 lb because she walked everywhere. Has tried paleo diet in the past and is currently juicing. Watches sodium and sugar on the nutrition labels. Uses alternative flours. Loves vegetables. 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 She lives at home with mom, dad, and sister. Pt states family with bad eating habits  Has a dog she can take on walks. Currently does not work but is searching. Does eat out often - burger and fries.    -Food recall:   Breakfast: non-fat greek yogurt with granola. Lunch: pasta with cherry tomato, spinach, garlic, and vinaigrette, or chicken with brown rice and zucchini. Dinner: 6-7:00 PM  Or 10:00 PM after a workout - leftovers from lunch or what mom is cooking - usually pastas or fried foods.    Snacks: Craves chocolate in the evenings and after lunch - 1-2 pieces dark chocolate OR yogurt with berries and flax seeds    -Main Beverages: 16 oz chocolate almond milk couple times per week, water, cranberry or apple juice, sugar-free flavor packet, pop    -Impression of Dietary Intake: on average, 3 meals per day, eating out often when lacking meal prepping. .    Current Outpatient Medications on File Prior to Visit   Medication Sig Dispense Refill    fexofenadine (EQ ALLERGY RELIEF) 180 MG tablet TAKE 1 TABLET BY MOUTH ONCE DAILY 90 tablet 3    fluticasone (FLONASE) 50 MCG/ACT nasal spray 2 sprays by Nasal route daily 3 Bottle 3    methylcellulose (EQ FIBER THERAPY) 500 MG TABS Take by mouth as needed      albuterol sulfate HFA (VENTOLIN HFA) 108 (90 Base) MCG/ACT inhaler Inhale 2 puffs into the lungs every 4 hours as needed for Wheezing or Shortness of Breath 2 Inhaler 3    Norgestimate-Eth Estradiol (SPRINTEC 28 PO) Take by mouth      ibuprofen (ADVIL;MOTRIN) 200 MG tablet Take 200 mg by mouth every 6 hours as needed for Pain      Spacer/Aero-Holding Chambers ROCIO 1 Device by Does not apply route daily as needed. (Patient not taking: Reported on 4/5/2021) 1 Device 0     No current facility-administered medications on file prior to visit. Vitals from current and previous visits:  Temp 98 °F (36.7 °C)   Ht 5' 6\" (1.676 m)   Wt 257 lb (116.6 kg)   BMI 41.48 kg/m²     -Body mass index is 41.48 kg/m². Greater than 40 - Morbid Obesity / Extreme Obesity / Grade III. -Weight goal: lose weight. Nutrition Diagnosis:   Obesity related to Lack of previous MNT/currently undergoing MNT as evidenced by Body mass index is 41.48 kg/m². .         Intervention:  -Impression: Pt reads a lot about nutrition, body size differences and hormones (cortisol/dopamine).     -Instructed the patient on: Carbohydrate Counting, Consistent Carbohydrate Intake, Food Label Reading, Healthy Choices When Dining Out, Meal Planning for Regular, Balanced Meals & Snacks, Plate Method and The Importance of Regular Physical Activity.    -Handouts given for: weight mgmt guide booklet, food logging, healthy snacks, fancy plate pics, 120 gm sample meal plans/menus and 400 calorie lunches, power up with brkf, calorie density, Budget with my plate, my plate resource list.    Patient Instructions   1.)  Get familiar with reading the nutrition facts label by looking at serving size and total carbohydrates and total fat grams.  - Without a label refer to your weight mgmt guide booklet. 2.)  Measure foods for accuracy in carb counting.    3.)  Healthy carb choices:  whole grains, whole wheat pasta, starchy vegetables, fresh fruit and lowfat/non-fat milk and yogurt. 4.)  Your meal plan is found on the inside cover of your carb counting guide booklet:  1st bernabe or Brkf:  45 gms carbohydrates + 1-2 oz protein  2nd meal or Lunch: 45 -60 gms carbohydrates + 2-3 oz protein + non-starchy veggies  3rd meal or Dinner:  45 gms carbohydrates + 2-3 oz protein + non- starchy veggies    Snack time:  15 - 20 gms carbohydrates + 1 oz protein    5.)  Choose lean protein most of the time and Cut in 1/2 added fats to help with weight loss efforts. 6.)  Great job with exercise routine - bump up your exercise as able. 7.)  Bring a 2 week food log to next dietitian eliana.    -Nutrition prescription: 1500 calories/day, 150 - 165 g carbs/day <50 gms fat/day    Comprehension verified using teachback method. Monitoring/Evaluation:   -Followup visit: 8 weeks with dietitian.   -Receptiveness to education/goals: Agreeable.  -Evaluation of education: Indicates understanding.  -Readiness to change: action - ready to set action plan and implement watching added sugars, plate method, cutting back on oils. -Expected compliance: good. Thank you for your referral of this patient. Total time involved in direct patient education: 60 minutes for initial MNT visit.

## 2021-04-05 NOTE — PATIENT INSTRUCTIONS
1. )  Get familiar with reading the nutrition facts label by looking at serving size and total carbohydrates and total fat grams.  - Without a label refer to your weight mgmt guide booklet. 2.)  Measure foods for accuracy in carb counting.    3.)  Healthy carb choices:  whole grains, whole wheat pasta, starchy vegetables, fresh fruit and lowfat/non-fat milk and yogurt. 4.)  Your meal plan is found on the inside cover of your carb counting guide booklet:  1st bernabe or Brkf:  45 gms carbohydrates + 1-2 oz protein  2nd meal or Lunch: 45 -60 gms carbohydrates + 2-3 oz protein + non-starchy veggies  3rd meal or Dinner:  45 gms carbohydrates + 2-3 oz protein + non- starchy veggies    Snack time:  15 - 20 gms carbohydrates + 1 oz protein    5.)  Choose lean protein most of the time and Cut in 1/2 added fats to help with weight loss efforts. 6.)  Great job with exercise routine - bump up your exercise as able. 7.)  Bring a 2 week food log to next dietitian eliana.

## 2021-04-20 ENCOUNTER — NURSE ONLY (OUTPATIENT)
Dept: LAB | Age: 26
End: 2021-04-20

## 2021-04-20 DIAGNOSIS — R80.9 MICROALBUMINURIA: ICD-10-CM

## 2021-04-20 LAB
ANION GAP SERPL CALCULATED.3IONS-SCNC: 13 MEQ/L (ref 8–16)
BACTERIA: ABNORMAL
BILIRUBIN URINE: NEGATIVE
BLOOD, URINE: ABNORMAL
BUN BLDV-MCNC: 17 MG/DL (ref 7–22)
CALCIUM SERPL-MCNC: 9.3 MG/DL (ref 8.5–10.5)
CASTS: ABNORMAL /LPF
CASTS: ABNORMAL /LPF
CHARACTER, URINE: CLEAR
CHLORIDE BLD-SCNC: 102 MEQ/L (ref 98–111)
CO2: 25 MEQ/L (ref 23–33)
COLOR: YELLOW
CREAT SERPL-MCNC: 1.2 MG/DL (ref 0.4–1.2)
CREATININE, URINE: 138.2 MG/DL
CRYSTALS: ABNORMAL
EPITHELIAL CELLS, UA: ABNORMAL /HPF
GFR SERPL CREATININE-BSD FRML MDRD: 55 ML/MIN/1.73M2
GLUCOSE BLD-MCNC: 83 MG/DL (ref 70–108)
GLUCOSE, URINE: NEGATIVE MG/DL
KETONES, URINE: NEGATIVE
LEUKOCYTE ESTERASE, URINE: ABNORMAL
MICROALBUMIN UR-MCNC: 4.97 MG/DL
MICROALBUMIN/CREAT UR-RTO: 36 MG/G (ref 0–30)
MISCELLANEOUS LAB TEST RESULT: ABNORMAL
NITRITE, URINE: NEGATIVE
PH UA: 6 (ref 5–9)
POTASSIUM SERPL-SCNC: 4.1 MEQ/L (ref 3.5–5.2)
PROTEIN UA: NEGATIVE MG/DL
RBC URINE: ABNORMAL /HPF
RENAL EPITHELIAL, UA: ABNORMAL
SODIUM BLD-SCNC: 140 MEQ/L (ref 135–145)
SPECIFIC GRAVITY UA: 1.02 (ref 1–1.03)
UROBILINOGEN, URINE: 0.2 EU/DL (ref 0–1)
WBC UA: ABNORMAL /HPF
YEAST: ABNORMAL

## 2021-04-26 ENCOUNTER — OFFICE VISIT (OUTPATIENT)
Dept: NEPHROLOGY | Age: 26
End: 2021-04-26
Payer: COMMERCIAL

## 2021-04-26 VITALS
BODY MASS INDEX: 40.67 KG/M2 | WEIGHT: 252 LBS | DIASTOLIC BLOOD PRESSURE: 102 MMHG | SYSTOLIC BLOOD PRESSURE: 141 MMHG | OXYGEN SATURATION: 99 % | TEMPERATURE: 98.6 F | HEART RATE: 92 BPM

## 2021-04-26 DIAGNOSIS — R80.9 MICROALBUMINURIA: Primary | ICD-10-CM

## 2021-04-26 DIAGNOSIS — R03.0 ELEVATED BLOOD PRESSURE READING: ICD-10-CM

## 2021-04-26 PROCEDURE — 1036F TOBACCO NON-USER: CPT | Performed by: INTERNAL MEDICINE

## 2021-04-26 PROCEDURE — 99213 OFFICE O/P EST LOW 20 MIN: CPT | Performed by: INTERNAL MEDICINE

## 2021-04-26 PROCEDURE — G8427 DOCREV CUR MEDS BY ELIG CLIN: HCPCS | Performed by: INTERNAL MEDICINE

## 2021-04-26 PROCEDURE — G8417 CALC BMI ABV UP PARAM F/U: HCPCS | Performed by: INTERNAL MEDICINE

## 2021-04-26 NOTE — PROGRESS NOTES
[facility]  [address] [facility] [address]  [address]  [facility] [address]  Dept: [phone]  Loc: [phone]  Progress Note  4/26/2021 3:56 PM      Pt Name:    Lisa Hannon  YOB: 1995  Primary Care Physician:  Marie Quintana DO     Chief Complaint:   Chief Complaint   Patient presents with    Follow-up     microalbuminuria         History of Present Illness: This is a follow-up visit for microalbuminuria. Hx of PCOS, asthma, IBS, obesity. BP is high today. She does not check it at home. Ate some fried food over the weekend. No edema. Struggling to lose weight. Pertinent items are noted in HPI. Past History:  Past Medical History:   Diagnosis Date    Allergic rhinitis     History of pneumonia     Irritable bowel syndrome with diarrhea     Mild intermittent asthma     Obesity (BMI 30-39. 9)     PCOS (polycystic ovarian syndrome)      Past Surgical History:   Procedure Laterality Date    TONSILLECTOMY      adnoids    WISDOM TOOTH EXTRACTION          VITALS:  BP (!) 141/102 (Site: Right Upper Arm, Position: Sitting, Cuff Size: Large Adult)   Pulse 92   Temp 98.6 °F (37 °C) (Temporal)   Wt 252 lb (114.3 kg)   SpO2 99%   BMI 40.67 kg/m²   Wt Readings from Last 3 Encounters:   04/26/21 252 lb (114.3 kg)   04/05/21 257 lb (116.6 kg)   10/20/20 248 lb (112.5 kg)     Body mass index is 40.67 kg/m².      General Appearance: alert and cooperative with exam, appears comfortable, no distress  HEENT: EOMI, moist oral mucus membranes  Neck: No jugular venous distention,   Lungs: Air entry B/L, no crackles or rales, no use of accessory muscles  Heart: S1, S2 heard, no rub  GI: soft, non-tender, no guarding,   Extremities: No sig LE edema, no cyanosis  Skin: warm, dry  Neurologic: no tremor, no asterixis, no focal neurologic deficits     Medications:  Current Outpatient Medications   Medication Sig Dispense Refill    fexofenadine (EQ ALLERGY RELIEF) 180 MG tablet TAKE 1 TABLET BY MOUTH ONCE DAILY 90 tablet 3    fluticasone (FLONASE) 50 MCG/ACT nasal spray 2 sprays by Nasal route daily 3 Bottle 3    methylcellulose (EQ FIBER THERAPY) 500 MG TABS Take by mouth as needed      albuterol sulfate HFA (VENTOLIN HFA) 108 (90 Base) MCG/ACT inhaler Inhale 2 puffs into the lungs every 4 hours as needed for Wheezing or Shortness of Breath 2 Inhaler 3    Norgestimate-Eth Estradiol (SPRINTEC 28 PO) Take by mouth      ibuprofen (ADVIL;MOTRIN) 200 MG tablet Take 200 mg by mouth every 6 hours as needed for Pain      Spacer/Aero-Holding Chambers ROCIO 1 Device by Does not apply route daily as needed. 1 Device 0     No current facility-administered medications for this visit.          Laboratory & Diagnostics:  CBC:   Lab Results   Component Value Date    WBC 6.3 09/29/2020    HGB 15.9 09/29/2020    HCT 47.5 (H) 09/29/2020    MCV 91.2 09/29/2020     09/29/2020     BMP:    Lab Results   Component Value Date     04/20/2021     09/29/2020     10/01/2019    K 4.1 04/20/2021    K 4.3 09/29/2020    K 3.9 10/01/2019     04/20/2021     09/29/2020     10/01/2019    CO2 25 04/20/2021    CO2 28 09/29/2020    CO2 26 10/01/2019    BUN 17 04/20/2021    BUN 18 09/29/2020    BUN 15 10/01/2019    CREATININE 1.2 04/20/2021    CREATININE 1.1 09/29/2020    CREATININE 1.1 10/01/2019    GLUCOSE 83 04/20/2021    GLUCOSE 92 09/29/2020    GLUCOSE 73 10/01/2019      Hepatic:   Lab Results   Component Value Date    AST 16 09/29/2020    AST 16 10/01/2019    AST 20 12/13/2018    ALT 11 09/29/2020    ALT 10 (L) 10/01/2019    ALT 18 12/13/2018    BILITOT 0.3 09/29/2020    BILITOT 0.4 10/01/2019    BILITOT 0.3 12/13/2018    ALKPHOS 82 09/29/2020    ALKPHOS 69 10/01/2019    ALKPHOS 73 12/13/2018     BNP: No results found for: BNP  Lipids:   Lab Results   Component Value Date    CHOL 217 (H) 09/29/2020    HDL 64 09/29/2020 INR: No results found for: INR  URINE:   Lab Results   Component Value Date    PROTUR 29.0 09/29/2020     Lab Results   Component Value Date    NITRU NEGATIVE 04/20/2021    COLORU YELLOW 04/20/2021    PHUR 6.0 04/20/2021    LABCAST 4-8 HYALINE 04/20/2021    LABCAST NONE SEEN 04/20/2021    WBCUA 25-50 04/20/2021    RBCUA 0-2 04/20/2021    YEAST NONE SEEN 04/20/2021    BACTERIA FEW 04/20/2021    SPECGRAV 1.017 04/20/2021    LEUKOCYTESUR MODERATE 04/20/2021    UROBILINOGEN 0.2 04/20/2021    BILIRUBINUR NEGATIVE 04/20/2021    BLOODU TRACE 04/20/2021    KETUA NEGATIVE 04/20/2021      Microalbumen/Creatinine ratio:  No components found for: RUCREAT        Impression/Plan:   1. Microalbuminuria: likely from obesity, mild, will continue to monitor. No hematuria noted. No indication for renal biopsy currently but will monitor closely. She did have bacteriuria noted on UA but was asymptomatic  2. CKD II? Renal US unremarkable. Again continue to monitor. Avoid NSAIDs. Stay well hydrated. Will consider renal biopsy if worsens. 3. Elevated BP: high sodium diet over the weekend. Discussed low sodium diet, exercise. Pt to record home Bps daily for the next 2 weeks and call with readings if remains elevated will start treatment with low dose ACE or ARB. We discussed teratogenicity of these meds she is currently not sexually active  4. PCOS  5. Obesity    Bloodwork and medications were reviewed and plan of care discussed with the patient. Return to clinic in 6 months  or sooner if the need arises.       Jairon Lopez, DO  [facility]

## 2021-05-03 ENCOUNTER — HOSPITAL ENCOUNTER (EMERGENCY)
Age: 26
Discharge: HOME | End: 2021-05-04
Attending: EMERGENCY MEDICINE
Payer: COMMERCIAL

## 2021-05-03 VITALS
RESPIRATION RATE: 18 BRPM | BODY MASS INDEX: 39.7 KG/M2 | TEMPERATURE: 97.7 F | DIASTOLIC BLOOD PRESSURE: 98 MMHG | OXYGEN SATURATION: 99 % | HEART RATE: 85 BPM | HEIGHT: 66 IN | WEIGHT: 247 LBS | SYSTOLIC BLOOD PRESSURE: 172 MMHG

## 2021-05-03 DIAGNOSIS — F41.1 ANXIETY STATE: Primary | ICD-10-CM

## 2021-05-03 PROCEDURE — 99283 EMERGENCY DEPT VISIT LOW MDM: CPT

## 2021-05-03 PROCEDURE — 6370000000 HC RX 637 (ALT 250 FOR IP): Performed by: EMERGENCY MEDICINE

## 2021-05-03 RX ORDER — LORAZEPAM 1 MG/1
0.5 TABLET ORAL ONCE
Status: COMPLETED | OUTPATIENT
Start: 2021-05-04 | End: 2021-05-03

## 2021-05-03 RX ADMIN — LORAZEPAM 0.5 MG: 1 TABLET ORAL at 23:51

## 2021-05-04 ENCOUNTER — OFFICE VISIT (OUTPATIENT)
Dept: FAMILY MEDICINE CLINIC | Age: 26
End: 2021-05-04
Payer: COMMERCIAL

## 2021-05-04 VITALS
WEIGHT: 250 LBS | DIASTOLIC BLOOD PRESSURE: 78 MMHG | OXYGEN SATURATION: 98 % | HEART RATE: 76 BPM | HEIGHT: 67 IN | BODY MASS INDEX: 39.24 KG/M2 | RESPIRATION RATE: 16 BRPM | TEMPERATURE: 99.1 F | SYSTOLIC BLOOD PRESSURE: 132 MMHG

## 2021-05-04 DIAGNOSIS — F41.9 ANXIETY: ICD-10-CM

## 2021-05-04 DIAGNOSIS — J45.20 MILD INTERMITTENT ASTHMA WITHOUT COMPLICATION: Chronic | ICD-10-CM

## 2021-05-04 DIAGNOSIS — F32.1 CURRENT MODERATE EPISODE OF MAJOR DEPRESSIVE DISORDER WITHOUT PRIOR EPISODE (HCC): Primary | Chronic | ICD-10-CM

## 2021-05-04 PROCEDURE — G8417 CALC BMI ABV UP PARAM F/U: HCPCS | Performed by: FAMILY MEDICINE

## 2021-05-04 PROCEDURE — G8427 DOCREV CUR MEDS BY ELIG CLIN: HCPCS | Performed by: FAMILY MEDICINE

## 2021-05-04 PROCEDURE — 1036F TOBACCO NON-USER: CPT | Performed by: FAMILY MEDICINE

## 2021-05-04 PROCEDURE — 99214 OFFICE O/P EST MOD 30 MIN: CPT | Performed by: FAMILY MEDICINE

## 2021-05-04 RX ORDER — ESCITALOPRAM OXALATE 10 MG/1
10 TABLET ORAL DAILY
Qty: 30 TABLET | Refills: 3 | Status: SHIPPED | OUTPATIENT
Start: 2021-05-04 | End: 2021-09-13

## 2021-05-04 RX ORDER — HYDROXYZINE HYDROCHLORIDE 25 MG/1
25 TABLET, FILM COATED ORAL 2 TIMES DAILY PRN
Qty: 20 TABLET | Refills: 0 | Status: SHIPPED | OUTPATIENT
Start: 2021-05-04 | End: 2021-05-04 | Stop reason: SDUPTHER

## 2021-05-04 RX ORDER — HYDROXYZINE HYDROCHLORIDE 25 MG/1
25 TABLET, FILM COATED ORAL EVERY 8 HOURS PRN
Qty: 90 TABLET | Refills: 0 | Status: SHIPPED | OUTPATIENT
Start: 2021-05-04

## 2021-05-04 ASSESSMENT — PATIENT HEALTH QUESTIONNAIRE - PHQ9
5. POOR APPETITE OR OVEREATING: 0
7. TROUBLE CONCENTRATING ON THINGS, SUCH AS READING THE NEWSPAPER OR WATCHING TELEVISION: 0
SUM OF ALL RESPONSES TO PHQ QUESTIONS 1-9: 13
9. THOUGHTS THAT YOU WOULD BE BETTER OFF DEAD, OR OF HURTING YOURSELF: 1
2. FEELING DOWN, DEPRESSED OR HOPELESS: 2
1. LITTLE INTEREST OR PLEASURE IN DOING THINGS: 2
SUM OF ALL RESPONSES TO PHQ QUESTIONS 1-9: 13
10. IF YOU CHECKED OFF ANY PROBLEMS, HOW DIFFICULT HAVE THESE PROBLEMS MADE IT FOR YOU TO DO YOUR WORK, TAKE CARE OF THINGS AT HOME, OR GET ALONG WITH OTHER PEOPLE: 3
6. FEELING BAD ABOUT YOURSELF - OR THAT YOU ARE A FAILURE OR HAVE LET YOURSELF OR YOUR FAMILY DOWN: 2

## 2021-05-04 ASSESSMENT — COLUMBIA-SUICIDE SEVERITY RATING SCALE - C-SSRS
2. HAVE YOU ACTUALLY HAD ANY THOUGHTS OF KILLING YOURSELF?: NO
6. HAVE YOU EVER DONE ANYTHING, STARTED TO DO ANYTHING, OR PREPARED TO DO ANYTHING TO END YOUR LIFE?: NO

## 2021-05-04 NOTE — ED PROVIDER NOTES
[facility] EMERGENCY DEPT      CHIEF COMPLAINT       Chief Complaint   Patient presents with    Anxiety       Nurses Notes reviewed and I agree except as noted in the HPI. HISTORY OF PRESENT ILLNESS    Nichole Duong is a 23 y.o. female who presents with complaint of anxiety, patient states that she has been having trouble getting a job for the past 2 years. States that she's had worsening anxiety, hypertension which gets worse whenever she is anxious. Patient has no suicidal/homicidal ideation. She does not take any medications for anxiety/depression. Onset: Chronic  Duration: Worsening over 2-year. Timing: Persistent intermittent  Location of Pain: No pain complaint  Intesity/severity: Moderate anxiety  Modifying Factors: Life stressors  Relieved by;  Previous Episodes; Tx Before arrival: None  REVIEW OF SYSTEMS      Review of Systems   Constitutional: Negative for fever, chills, diaphoresis and fatigue. HENT: Negative for congestion, drooling, facial swelling and sore throat. Eyes: Negative for photophobia, pain and discharge. Respiratory: Negative for cough, shortness of breath, wheezing and stridor. Cardiovascular: Negative for chest pain, palpitations and leg swelling. Gastrointestinal: Negative for abdominal pain, blood in stool and abdominal distention. Genitourinary: Negative for dysuria, urgency, hematuria and difficulty urinating. Musculoskeletal: Negative for gait problem, neck pain and neck stiffness. Skin; No rash, No itching  Neurological: Negative for seizures, weakness and numbness. Psychiatric/Behavioral: Negative for hallucinations, confusion and agitation. Positive for anxiety. PAST MEDICAL HISTORY    has a past medical history of Allergic rhinitis, History of pneumonia, Irritable bowel syndrome with diarrhea, Mild intermittent asthma, Obesity (BMI 30-39.9), and PCOS (polycystic ovarian syndrome).     SURGICAL HISTORY      has a past surgical history that includes Tonsillectomy and Osteen tooth extraction. CURRENT MEDICATIONS       Previous Medications    ALBUTEROL SULFATE HFA (VENTOLIN HFA) 108 (90 BASE) MCG/ACT INHALER    Inhale 2 puffs into the lungs every 4 hours as needed for Wheezing or Shortness of Breath    FEXOFENADINE (EQ ALLERGY RELIEF) 180 MG TABLET    TAKE 1 TABLET BY MOUTH ONCE DAILY    FLUTICASONE (FLONASE) 50 MCG/ACT NASAL SPRAY    2 sprays by Nasal route daily    METHYLCELLULOSE (EQ FIBER THERAPY) 500 MG TABS    Take by mouth as needed    NORGESTIMATE-ETH ESTRADIOL (SPRINTEC 28 PO)    Take by mouth    SPACER/AERO-HOLDING CHAMBERS ROCIO    1 Device by Does not apply route daily as needed. ALLERGIES     has No Known Allergies. FAMILY HISTORY     She indicated that her mother is alive. She indicated that her father is alive. She indicated that the status of her neg hx is unknown.   family history includes Diabetes in her mother; High Blood Pressure in her father and mother; High Cholesterol in her father. SOCIAL HISTORY      reports that she has never smoked. She has never used smokeless tobacco. She reports that she does not drink alcohol or use drugs. PHYSICAL EXAM     INITIAL VITALS:  height is 5' 6\" (1.676 m) and weight is 247 lb (112 kg). Her oral temperature is 97.7 °F (36.5 °C). Her blood pressure is 172/98 (abnormal) and her pulse is 85. Her respiration is 18 and oxygen saturation is 99%. Physical Exam   Constitutional:  well-developed and well-nourished. HENT: Head: Normocephalic, atraumatic, Bilateral external ears normal, Oropharynx mosit, No oral exudates, Nose normal.   Eyes: PERRL, EOMI, Conjunctiva normal, No discharge. No scleral icterus  Neck: Normal range of motion, No tenderness, Supple  Cardiovascular: Normal rate, regular rhythm, S1 normal and S2 normal.  Exam reveals no gallop. Pulmonary/Chest: Effort normal and breath sounds normal. No accessory muscle usage or stridor. No respiratory distress.   no wheezes. has no rales. exhibits no tenderness. Abdominal: Soft. Bowel sounds are normal.  exhibits no distension. There is no tenderness. There is no rebound and no guarding. Extremities: No edema, no tenderness, no cyanosis, no clubbing. Musculoskeletal: Good range of motion in major joints is observed. No major deformities noted. Neurological: Alert and oriented ×3, normal motor function, normal sensory function, no focal deficits. GCS 15  Skin: Skin is warm, dry and intact. No rash noted. No erythema. Psychiatric: Affect normal, judgment normal, mood normal.  DIFFERENTIAL DIAGNOSIS:       DIAGNOSTIC RESULTS     EKG: All EKG's are interpreted by the Emergency Department Physician who either signs or Co-signs this chart in the absence of a cardiologist.      RADIOLOGY: non-plain film images(s) such as CT, Ultrasound and MRI are read by the radiologist.  Plain radiographic images are visualized and preliminarily interpreted by the emergency physician unless otherwise stated below. LABS:   Labs Reviewed - No data to display    EMERGENCY DEPARTMENT COURSE:   Vitals:    Vitals:    05/03/21 2322   BP: (!) 172/98   Pulse: 85   Resp: 18   Temp: 97.7 °F (36.5 °C)   TempSrc: Oral   SpO2: 99%   Weight: 247 lb (112 kg)   Height: 5' 6\" (1.676 m)   Patient presenting with complaint of anxiety, not suicidal homicidal.  Patient states that she has been having a lot of life stressors, which makes her anxious. CRITICAL CARE:       CONSULTS:  None    PROCEDURES:  None    FINAL IMPRESSION      1.  Anxiety state          DISPOSITION/PLAN       PATIENT REFERRED TO:  Ranell [address] [place] [address]  [phone]    Call today  RE-CHECK AND FURTHER TESTING AS NEEDED      DISCHARGE MEDICATIONS:  New Prescriptions    HYDROXYZINE (ATARAX) 25 MG TABLET    Take 1 tablet by mouth 2 times daily as needed for Itching or Anxiety       (Please note that portions of this note were completed with a voice recognition program.  Efforts were made to edit the dictations but occasionally words are mis-transcribed.)    Edi Yusuf, DO Edi Yusuf,   05/04/21 8146

## 2021-05-04 NOTE — PROGRESS NOTES
Diagnosis Date    Allergic rhinitis     History of pneumonia     Irritable bowel syndrome with diarrhea     Major depression     Mild intermittent asthma     Obesity (BMI 30-39. 9)     PCOS (polycystic ovarian syndrome)        Past Surgical History:   Procedure Laterality Date    TONSILLECTOMY      adnoids    WISDOM TOOTH EXTRACTION         No Known Allergies      Social History     Tobacco Use    Smoking status: Never Smoker    Smokeless tobacco: Never Used   Substance Use Topics    Alcohol use: No       Family History   Problem Relation Age of Onset    Diabetes Mother     High Blood Pressure Mother     High Blood Pressure Father     High Cholesterol Father     Colon Cancer Neg Hx     Breast Cancer Neg Hx          I have reviewed the patient's past medical history, past surgical history, allergies, medications, social and family history and I have made updates where appropriate.       Review of Systems  Positive responses are highlighted in bold    Constitutional:  Fever, Chills, Night Sweats, Fatigue, Unexpected changes in weight  HENT:  Ear pain, Tinnitus, Nosebleeds, Trouble swallowing, Hearing loss, Sore throat  Cardiovascular:  Chest Pain, Palpitations, Orthopnea, Paroxysmal Nocturnal Dyspnea  Respiratory:  Cough, Wheezing, Shortness of breath, Chest tightness, Apnea  Gastrointestinal:  Nausea, Vomiting, Diarrhea, Constipation, Heartburn, Blood in stool  Genitourinary:  Difficulty or painful urination, Flank pain, Change in frequency, Urgency  Skin:  Color change, Rash, Itching, Wound  Musculoskeletal:  Joint pain, Back pain, Gait problems, Joint swelling, Myalgias  Neurological:  Dizziness, Headaches, Presyncope, Numbness, Seizures, Tremors  Endocrine:  Heat Intolerance, Cold Intolerance, Polydipsia, Polyphagia, Polyuria      PHYSICAL EXAM:  Vitals:    05/04/21 1533   BP: 132/78   Pulse: 76   Resp: 16   Temp: 99.1 °F (37.3 °C)   TempSrc: Oral   SpO2: 98%   Weight: 250 lb (113.4 kg)   Height: 5' 6.5\" (1.689 m)     Body mass index is 39.75 kg/m². Pain Score:   0 - No pain    VS Reviewed  General Appearance: A&O x 3, No acute distress,well developed and well- nourished  Eyes: pupils equal, round, and reactive to light, extraocular eye movements intact, conjunctivae and eye lids without erythema  ENT: external ear and ear canal clear bilaterally, TMs intact and regular, nose without deformity, nasal mucosa and turbinates normal without polyps, oropharynx normal, dentition is normal for age  Neck: supple and non-tender without mass, no thyromegaly or thyroid nodules, no cervical lymphadenopathy  Pulmonary/Chest: clear to auscultation bilaterally- no wheezes, rales or rhonchi, normal air movement, no respiratory distress or retractions  Cardiovascular: S1 and S2 auscultated w/ RRR. No murmurs, rubs, clicks, or gallops, distal pulses intact. Abdomen: soft, non-tender, non-distended, bowl sounds physiologic,  no rebound or guarding, no masses or hernias noted. Liver and spleen without enlargement. Extremities: no cyanosis, clubbing or edema of the lower extremities. Skin: warm and dry, no rash or erythema  Psych: Affect constricted. Mood depressed/anxious. Thought process is normal without evidence of psychosis. Good insight and appropriate interaction. Cognition and memory appear to be intact. ASSESSMENT & PLAN  1. Current moderate episode of major depressive disorder without prior episode ([facility] [address]    Start lexapro and prn atarax  Agree with counseling through her [facility]  F/u here 6 wks    Discussed side effects and benefits of lexapro. I advised her that if she develops any SI/HI or concerning symptoms after starting the medication she needs to stop the medication and seek treatment immediately    - escitalopram (LEXAPRO) 10 MG tablet; Take 1 tablet by mouth daily  Dispense: 30 tablet; Refill: 3  - hydrOXYzine (ATARAX) 25 MG tablet;  Take 1 tablet by mouth every 8 hours as needed for Anxiety Dispense: 90 tablet; Refill: 0    2. Anxiety    As per # 1    - escitalopram (LEXAPRO) 10 MG tablet; Take 1 tablet by mouth daily  Dispense: 30 tablet; Refill: 3  - hydrOXYzine (ATARAX) 25 MG tablet; Take 1 tablet by mouth every 8 hours as needed for Anxiety  Dispense: 90 tablet; Refill: 0    3. Mild intermittent asthma without complication    Stable  con't prn alb      DISPOSITION    Return in about 6 weeks (around 6/15/2021) for follow-up depression, follow-up anxiety, sooner as needed. Ivette Orourke released without restrictions. Future Appointments   Date Time Provider Stanislav Tellez   6/2/2021 10:00 AM Morgan Workman RD, LD [facility] [employer]  [place] [address]   6/15/2021  2:40 PM [facility] DO [facility] [address] [place]   11/1/2021  3:40 PM [id number] Rosa Turner DO [facility] [employer] [address]     PATIENT COUNSELING    Patient given educational materials on: See Attached    Barriers to learning and self management: none    Discussed use, benefit, and side effects of prescribed medications. Barriers to medication compliance addressed. All patient questions answered. Pt voiced understanding.        Electronically signed by Tim Rios DO on 5/4/2021 at 4:02 PM

## 2021-05-04 NOTE — PATIENT INSTRUCTIONS
Patient Education        Recovering From Depression: Care Instructions  Your Care Instructions     Taking good care of yourself is important as you recover from depression. In time, your symptoms will fade as your treatment takes hold. Do not give up. Instead, focus your energy on getting better. Your mood will improve. It just takes some time. Focus on things that can help you feel better, such as being with friends and family, eating well, and getting enough rest. But take things slowly. Do not do too much too soon. You will begin to feel better gradually. Follow-up care is a key part of your treatment and safety. Be sure to make and go to all appointments, and call your doctor if you are having problems. It's also a good idea to know your test results and keep a list of the medicines you take. How can you care for yourself at home? Be realistic  · If you have a large task to do, break it up into smaller steps you can handle, and just do what you can. · You may want to put off important decisions until your depression has lifted. If you have plans that will have a major impact on your life, such as marriage, divorce, or a job change, try to wait a bit. Talk it over with friends and loved ones who can help you look at the overall picture first.  · Reaching out to people for help is important. Do not isolate yourself. Let your family and friends help you. Find someone you can trust and confide in, and talk to that person. · Be patient, and be kind to yourself. Remember that depression is not your fault and is not something you can overcome with willpower alone. Treatment is important for depression, just like for any other illness. Feeling better takes time, and your mood will improve little by little. Stay active  · Stay busy and get outside. Take a walk, or try some other light exercise. · Talk with your doctor about an exercise program. Exercise can help with mild depression.   · Go to a movie or instruction, always ask your healthcare professional. [place] [address] any warranty or liability for your use of this information.

## 2021-05-12 ENCOUNTER — TELEPHONE (OUTPATIENT)
Dept: NEPHROLOGY | Age: 26
End: 2021-05-12

## 2021-05-12 DIAGNOSIS — R03.0 ELEVATED BLOOD PRESSURE READING: Primary | ICD-10-CM

## 2021-05-12 DIAGNOSIS — R80.9 MICROALBUMINURIA: ICD-10-CM

## 2021-05-12 NOTE — TELEPHONE ENCOUNTER
Left a voicemail for patient. Explained previous message in detail. Asked for a return call to the office to acknowledge receipt of orders. Script pending for lisinopril.

## 2021-05-12 NOTE — TELEPHONE ENCOUNTER
Ok, advise to start Lisinopril 5 mg daily and repeat bmp in 2 weeks. We discussed during the office visit that this medication cannot be taken during pregnancy but just remind her of that. If she ever has plans to get pregnant in future then I need to know so we can stop the medication prior.

## 2021-05-13 RX ORDER — LISINOPRIL 5 MG/1
5 TABLET ORAL DAILY
Qty: 90 TABLET | Refills: 1 | Status: SHIPPED | OUTPATIENT
Start: 2021-05-13 | End: 2021-11-22 | Stop reason: SDUPTHER

## 2021-06-14 ENCOUNTER — TELEPHONE (OUTPATIENT)
Dept: FAMILY MEDICINE CLINIC | Age: 26
End: 2021-06-14

## 2021-06-15 NOTE — TELEPHONE ENCOUNTER
Future Appointments   Date Time Provider Stanislav Tellez   9/28/2021  1:20 PM Roxane Stewart DO [facility] [place] [address]   11/1/2021  3:40 PM [id number] DO [facility] [place]

## 2021-09-12 ENCOUNTER — PATIENT MESSAGE (OUTPATIENT)
Dept: FAMILY MEDICINE CLINIC | Age: 26
End: 2021-09-12

## 2021-09-12 DIAGNOSIS — F32.1 CURRENT MODERATE EPISODE OF MAJOR DEPRESSIVE DISORDER WITHOUT PRIOR EPISODE (HCC): Chronic | ICD-10-CM

## 2021-09-12 DIAGNOSIS — F41.9 ANXIETY: ICD-10-CM

## 2021-09-13 RX ORDER — ESCITALOPRAM OXALATE 10 MG/1
TABLET ORAL
Qty: 90 TABLET | Refills: 3 | Status: SHIPPED | OUTPATIENT
Start: 2021-09-13 | End: 2021-09-13

## 2021-09-13 NOTE — TELEPHONE ENCOUNTER
From: Serafin Will  To: Cierra Floyd DO  Sent: 9/12/2021 4:42 PM EDT  Subject: Prescription Question    I dont need my anxiety medicine anymore. I have a good job and I feel much better about my life. Thank you for your help.

## 2021-09-13 NOTE — TELEPHONE ENCOUNTER
Recent Visits  Date Type Provider Dept   05/04/21 Office Visit Emmy Christy DO [facility]   09/29/20 Office Visit Emmy Christy DO [facility]   Showing recent visits within past 540 days with a meds authorizing provider and meeting all other requirements  Future Appointments  Date Type Provider Dept   09/28/21 Appointment Emmy Christy DO [facility]   Showing future appointments within next 150 days with a meds authorizing provider and meeting all other requirements    Future Appointments   Date Time Provider Stanislav Tellez   9/28/2021  1:20 PM Emmy Christy DO [facility] [address] [facility]   11/1/2021  3:40 PM Nidhi Camarillo DO [facility] [address] [facility]

## 2021-09-27 NOTE — PROGRESS NOTES
Chief Complaint   Patient presents with    Follow-up     issues as noted below    Back Pain       TELEHEALTH EVALUATION -- Audio/Visual (During SDSUC-44 public health emergency)    Due to COVID 19 outbreak, patient's office visit was converted to a virtual visit. Patient was contacted and agreed to proceed with a virtual visit via [address] Visit  The risks and benefits of converting to a virtual visit were discussed in light of the current infectious disease epidemic. Patient also understood that insurance coverage and co-pays are up to their individual insurance plans. Services were provided through a video synchronous discussion virtually to substitute for in-person clinic visit    Location of patient: home  Location of physician: office    Identification confirmed by: Patient : [id number]    Pursuant to the emergency declaration under the [facility], [facility] waiver authority and the [employer] Act, this Virtual  Visit was conducted, with patient's (and/or legal guardian's) consent, to reduce the patient's risk of exposure to COVID-19 and provide necessary medical care. The patient (and/or legal guardian) has also been advised to contact this office for worsening conditions or problems, and seek emergency medical treatment and/or call 911 if deemed necessary. Services were provided through a video synchronous discussion virtually to substitute for in-person clinic visit. Due to this being a TeleHealth encounter, evaluation of certain organ systems is limited. History obtained from the patient.     SUBJECTIVE:  Tyshawn Griffith is a 27 y.o. female that presents today for     -Depressed Mood/anxiety LAST VISIT:    HPI:  Inc depressive sxs the last several months  Lot of stress  In ED last night for panic attack  Has occ vague thoughts of suicide  No plan  Would never do it  Here with mom  Never treated  Willing to start meds  Working with her Religious for therapy    Depressed Mood? yes -   Anhedonia? yes -   Appetite changes? yes -   Sleep disturbances? yes -   Feelings of guilt? yes -   Decreased energy? yes -   Impaired concentration? yes -   Substance abuse? no    Suicidal/Homicidal Ideation? no      UPDATE TODAY:   Doing better  Able to wean off lexapro 2 to 3 wks ago  So far doing ok w/o it  Moods better and stable thus far  Less stress  Wants to con't off for now       -Obesity:  Working on diet changes/wt loss        -HTN/Microalbumin:    HPI:  nephro started pt on lisinopril  BP's were running high  Better now  Pt aware of teratogenic effects if were to become pregnant  Is on OCP    Taking meds as prescribed ?: yes  Tolerating well ?: yes  Side Effects ?: denies  BP at home ?: <140/90  Working on TLCS ?: yes  Chest Pain/SOB/Palpitations? denies    BP Readings from Last 3 Encounters:   05/04/21 132/78   05/03/21 (!) 172/98   04/26/21 (!) 141/102         -Low Back Pain:    HPI:   Started Saturday  Low back, bilat  No radicular sxs  Is improving with HEP    Inciting injury or history of trauma? No  Pain is relieved by - rest  Pain is aggravated by - movement  Radiation of the pain? No  Paresthesias of the extremities? No  Saddle anesthesia? No  Bowel or bladder incontinence?  No  Treatments tried - as above      Age/Gender Health Maintenance    Lipid -   Lab Results   Component Value Date    CHOL 217 (H) 09/29/2020    CHOL 214 (H) 12/13/2018     Lab Results   Component Value Date    TRIG 120 09/29/2020    TRIG 103 12/13/2018     Lab Results   Component Value Date    HDL 64 09/29/2020    HDL 56 12/13/2018     Lab Results   Component Value Date    LDLCALC 129 09/29/2020    LDLCALC 137 12/13/2018     No results found for: LABVLDL, VLDL  No results found for: CHOLHDLRATIO      DM Screen -   Lab Results   Component Value Date    GLUCOSE 83 04/20/2021    GLUCOSE 97 10/19/2011       Colon Cancer Screening - NEG OCT 2019, repeat age 50. Per GI, Dr. Shira Fine. Lung Cancer Screening (Age 54 to [de-identified] with 27 pack year hx, current smoker or quit within past 15 years) - never smoker    Tetanus - UTD SEPT 2020  Influenza Vaccine - UTD FALL 2021  Pneumonia Vaccine - UTD PPV 23 JAN 2018  Zoster - age 48     Breast Cancer Screening - age 44-55  Cervical Cancer Screening - NEG June 2019  Osteoporosis Screening - age 72      Current Outpatient Medications   Medication Sig Dispense Refill    lisinopril (PRINIVIL;ZESTRIL) 5 MG tablet Take 1 tablet by mouth daily 90 tablet 1    hydrOXYzine (ATARAX) 25 MG tablet Take 1 tablet by mouth every 8 hours as needed for Anxiety 90 tablet 0    fexofenadine (EQ ALLERGY RELIEF) 180 MG tablet TAKE 1 TABLET BY MOUTH ONCE DAILY 90 tablet 3    fluticasone (FLONASE) 50 MCG/ACT nasal spray 2 sprays by Nasal route daily 3 Bottle 3    albuterol sulfate HFA (VENTOLIN HFA) 108 (90 Base) MCG/ACT inhaler Inhale 2 puffs into the lungs every 4 hours as needed for Wheezing or Shortness of Breath 2 Inhaler 3    Norgestimate-Eth Estradiol (SPRINTEC 28 PO) Take by mouth      Spacer/Aero-Holding Chambers ROCIO 1 Device by Does not apply route daily as needed. 1 Device 0     No current facility-administered medications for this visit. No orders of the defined types were placed in this encounter. All medications reviewed and reconciled, including OTC and herbal medications. Updated list given to patient. Patient Active Problem List    Diagnosis Date Noted    Major depression     Hypertension, essential     Irritable bowel syndrome with diarrhea     Microalbuminuria     Allergic rhinitis     History of pneumonia     Mild intermittent asthma     PCOS (polycystic ovarian syndrome)     Obesity (BMI 30-39. 9)        Past Medical History:   Diagnosis Date    Allergic rhinitis     History of pneumonia     Hypertension, essential     Irritable bowel syndrome with diarrhea     Major depression     Mild intermittent asthma     Obesity (BMI 30-39. 9)     PCOS (polycystic ovarian syndrome)        Past Surgical History:   Procedure Laterality Date    TONSILLECTOMY      adnoids    WISDOM TOOTH EXTRACTION         No Known Allergies      Social History     Tobacco Use    Smoking status: Never Smoker    Smokeless tobacco: Never Used   Substance Use Topics    Alcohol use: No       Family History   Problem Relation Age of Onset    Diabetes Mother     High Blood Pressure Mother     High Blood Pressure Father     High Cholesterol Father     Colon Cancer Neg Hx     Breast Cancer Neg Hx          I have reviewed the patient's past medical history, past surgical history, allergies, medications, social and family history and I have made updates where appropriate.       Review of Systems  Positive responses are highlighted in bold    Constitutional:  Fever, Chills, Night Sweats, Fatigue, Unexpected changes in weight  HENT:  Ear pain, Tinnitus, Nosebleeds, Trouble swallowing, Hearing loss, Sore throat  Cardiovascular:  Chest Pain, Palpitations, Orthopnea, Paroxysmal Nocturnal Dyspnea  Respiratory:  Cough, Wheezing, Shortness of breath, Chest tightness, Apnea  Gastrointestinal:  Nausea, Vomiting, Diarrhea, Constipation, Heartburn, Blood in stool  Genitourinary:  Difficulty or painful urination, Flank pain, Change in frequency, Urgency  Skin:  Color change, Rash, Itching, Wound  Musculoskeletal:  Joint pain, Back pain, Gait problems, Joint swelling, Myalgias  Neurological:  Dizziness, Headaches, Presyncope, Numbness, Seizures, Tremors  Endocrine:  Heat Intolerance, Cold Intolerance, Polydipsia, Polyphagia, Polyuria      PHYSICAL EXAM:  Not all vitals able to be obtained, video visit  Patient-Reported Vitals 9/28/2021   Patient-Reported Pulse 76   Patient-Reported Temperature 98.3      Vitals:    09/28/21 1330   Resp: 12          General Appearance: A&O x 3, No acute distress,well developed and well- nourished  Head: normocephalic and atraumatic  Eyes: pupils equal, round, and reactive to light, extraocular eye movements intact, conjunctivae and eye lids without erythema  ENT: External ears w/o redness, external nares without redness or discharge. Hearing is intact. Lips are w/o lesion. Teeth are in good repair. Pulmonary/Chest: normal respiratory effort. Normal respiratory rate. No respiratory distress . Skin: warm and dry, no rash or erythema to visible areas. Psych: Affect appropriate. Mood euthymic. Thought process is normal without evidence of depression or psychosis. Good insight and appropriate interaction. Cognition and memory appear to be intact. ASSESSMENT & PLAN  1. Major depressive disorder with single episode, in full remission ([address]    In remission  Off [address] to con't prn atarax if needed  F/u if were to have recurrence    2. Anxiety    As per # 1    3. Obesity (BMI 30-39.9)    con't with wt loss measures    4. Hypertension, essential    con't lisinopril per nephro  Labs reviewed  Pt aware of teratogenic risk with lisinopril, con't OCP    5. Microalbuminuria    As per # 4    6. Acute bilateral low back pain without sciatica    con't HEP, yoga, tylenol  F/u if not improving. DISPOSITION    Return if symptoms worsen or fail to improve. Sondra Bautista released without restrictions. Future Appointments   Date Time Provider Stanislav Tellez   11/1/2021  3:40 PM [id number] Rosa [facility]     PATIENT COUNSELING    Barriers to learning and self management: none    Discussed use, benefit, and side effects of prescribed medications. Barriers to medication compliance addressed. All patient questions answered. Pt voiced understanding.        Electronically signed by Molly Del Angel DO on 9/28/2021 at 1:32 PM

## 2021-09-28 ENCOUNTER — TELEMEDICINE (OUTPATIENT)
Dept: FAMILY MEDICINE CLINIC | Age: 26
End: 2021-09-28
Payer: COMMERCIAL

## 2021-09-28 VITALS — RESPIRATION RATE: 12 BRPM

## 2021-09-28 DIAGNOSIS — F32.5 MAJOR DEPRESSIVE DISORDER WITH SINGLE EPISODE, IN FULL REMISSION (HCC): Primary | ICD-10-CM

## 2021-09-28 DIAGNOSIS — F41.9 ANXIETY: ICD-10-CM

## 2021-09-28 DIAGNOSIS — R80.9 MICROALBUMINURIA: ICD-10-CM

## 2021-09-28 DIAGNOSIS — I10 HYPERTENSION, ESSENTIAL: ICD-10-CM

## 2021-09-28 DIAGNOSIS — E66.9 OBESITY (BMI 30-39.9): Chronic | ICD-10-CM

## 2021-09-28 DIAGNOSIS — M54.50 ACUTE BILATERAL LOW BACK PAIN WITHOUT SCIATICA: ICD-10-CM

## 2021-09-28 PROCEDURE — 99214 OFFICE O/P EST MOD 30 MIN: CPT | Performed by: FAMILY MEDICINE

## 2021-09-28 PROCEDURE — G8427 DOCREV CUR MEDS BY ELIG CLIN: HCPCS | Performed by: FAMILY MEDICINE

## 2021-11-15 ENCOUNTER — NURSE ONLY (OUTPATIENT)
Dept: LAB | Age: 26
End: 2021-11-15

## 2021-11-15 DIAGNOSIS — R03.0 ELEVATED BLOOD PRESSURE READING: ICD-10-CM

## 2021-11-15 DIAGNOSIS — R80.9 MICROALBUMINURIA: ICD-10-CM

## 2021-11-15 LAB
ANION GAP SERPL CALCULATED.3IONS-SCNC: 16 MEQ/L (ref 8–16)
BACTERIA: ABNORMAL /HPF
BILIRUBIN URINE: NEGATIVE
BLOOD, URINE: NEGATIVE
BUN BLDV-MCNC: 24 MG/DL (ref 7–22)
CALCIUM SERPL-MCNC: 10 MG/DL (ref 8.5–10.5)
CASTS 2: ABNORMAL /LPF
CASTS UA: ABNORMAL /LPF
CHARACTER, URINE: CLEAR
CHLORIDE BLD-SCNC: 100 MEQ/L (ref 98–111)
CO2: 22 MEQ/L (ref 23–33)
COLOR: YELLOW
CREAT SERPL-MCNC: 1 MG/DL (ref 0.4–1.2)
CREATININE, URINE: 122.2 MG/DL
CRYSTALS, UA: ABNORMAL
EOSINOPHIL SMEAR: NORMAL
EPITHELIAL CELLS, UA: ABNORMAL /HPF
GFR SERPL CREATININE-BSD FRML MDRD: 68 ML/MIN/1.73M2
GLUCOSE BLD-MCNC: 80 MG/DL (ref 70–108)
GLUCOSE URINE: NEGATIVE MG/DL
KETONES, URINE: NEGATIVE
LEUKOCYTE ESTERASE, URINE: ABNORMAL
MICROALBUMIN UR-MCNC: 3.52 MG/DL
MICROALBUMIN/CREAT UR-RTO: 29 MG/G (ref 0–30)
MISCELLANEOUS 2: ABNORMAL
NITRITE, URINE: NEGATIVE
PH UA: 5.5 (ref 5–9)
POTASSIUM SERPL-SCNC: 4.4 MEQ/L (ref 3.5–5.2)
PROTEIN UA: NEGATIVE
RBC URINE: ABNORMAL /HPF
RENAL EPITHELIAL, UA: ABNORMAL
SODIUM BLD-SCNC: 138 MEQ/L (ref 135–145)
SPECIFIC GRAVITY, URINE: 1.02 (ref 1–1.03)
SPECIMEN: NORMAL
UROBILINOGEN, URINE: 0.2 EU/DL (ref 0–1)
WBC UA: ABNORMAL /HPF
YEAST: ABNORMAL

## 2021-11-22 ENCOUNTER — VIRTUAL VISIT (OUTPATIENT)
Dept: NEPHROLOGY | Age: 26
End: 2021-11-22
Payer: COMMERCIAL

## 2021-11-22 DIAGNOSIS — R80.9 MICROALBUMINURIA: Primary | ICD-10-CM

## 2021-11-22 PROCEDURE — 1036F TOBACCO NON-USER: CPT | Performed by: INTERNAL MEDICINE

## 2021-11-22 PROCEDURE — G8484 FLU IMMUNIZE NO ADMIN: HCPCS | Performed by: INTERNAL MEDICINE

## 2021-11-22 PROCEDURE — G8417 CALC BMI ABV UP PARAM F/U: HCPCS | Performed by: INTERNAL MEDICINE

## 2021-11-22 PROCEDURE — 99212 OFFICE O/P EST SF 10 MIN: CPT | Performed by: INTERNAL MEDICINE

## 2021-11-22 PROCEDURE — G8427 DOCREV CUR MEDS BY ELIG CLIN: HCPCS | Performed by: INTERNAL MEDICINE

## 2021-11-22 RX ORDER — LISINOPRIL 5 MG/1
5 TABLET ORAL DAILY
Qty: 90 TABLET | Refills: 3 | Status: SHIPPED | OUTPATIENT
Start: 2021-11-22

## 2021-11-22 RX ORDER — AZELASTINE HYDROCHLORIDE 0.5 MG/ML
SOLUTION/ DROPS OPHTHALMIC
COMMUNITY
Start: 2021-11-16

## 2021-11-22 NOTE — PROGRESS NOTES
250 lb (113.4 kg)   05/03/21 247 lb (112 kg)   04/26/21 252 lb (114.3 kg)     There is no height or weight on file to calculate BMI. General Appearance: alert and cooperative with exam, appears comfortable, no distress  HEENT: EOMI, moist oral mucus membranes  Neck: No jugular venous distention,   Lungs: Air entry B/L, no crackles or rales, no use of accessory muscles  Heart: S1, S2 heard, no rub  GI: soft, non-tender, no guarding,   Extremities: No sig LE edema, no cyanosis  Skin: warm, dry  Neurologic: no tremor, no asterixis, no focal neurologic deficits     Medications:  Current Outpatient Medications   Medication Sig Dispense Refill    azelastine (OPTIVAR) 0.05 % ophthalmic solution       lisinopril (PRINIVIL;ZESTRIL) 5 MG tablet Take 1 tablet by mouth daily 90 tablet 1    hydrOXYzine (ATARAX) 25 MG tablet Take 1 tablet by mouth every 8 hours as needed for Anxiety 90 tablet 0    fexofenadine (EQ ALLERGY RELIEF) 180 MG tablet TAKE 1 TABLET BY MOUTH ONCE DAILY 90 tablet 3    fluticasone (FLONASE) 50 MCG/ACT nasal spray 2 sprays by Nasal route daily 3 Bottle 3    albuterol sulfate HFA (VENTOLIN HFA) 108 (90 Base) MCG/ACT inhaler Inhale 2 puffs into the lungs every 4 hours as needed for Wheezing or Shortness of Breath 2 Inhaler 3    Norgestimate-Eth Estradiol (SPRINTEC 28 PO) Take by mouth      Spacer/Aero-Holding Chambers ROCIO 1 Device by Does not apply route daily as needed. 1 Device 0     No current facility-administered medications for this visit.         Laboratory & Diagnostics:  CBC:   Lab Results   Component Value Date    WBC 6.3 09/29/2020    HGB 15.9 09/29/2020    HCT 47.5 (H) 09/29/2020    MCV 91.2 09/29/2020     09/29/2020     BMP:    Lab Results   Component Value Date     11/15/2021     04/20/2021     09/29/2020    K 4.4 11/15/2021    K 4.1 04/20/2021    K 4.3 09/29/2020     11/15/2021     04/20/2021     09/29/2020    CO2 22 (L) 11/15/2021    CO2 25 04/20/2021    CO2 28 09/29/2020    BUN 24 (H) 11/15/2021    BUN 17 04/20/2021    BUN 18 09/29/2020    CREATININE 1.0 11/15/2021    CREATININE 1.2 04/20/2021    CREATININE 1.1 09/29/2020    GLUCOSE 80 11/15/2021    GLUCOSE 83 04/20/2021    GLUCOSE 92 09/29/2020      Hepatic:   Lab Results   Component Value Date    AST 16 09/29/2020    AST 16 10/01/2019    AST 20 12/13/2018    ALT 11 09/29/2020    ALT 10 (L) 10/01/2019    ALT 18 12/13/2018    BILITOT 0.3 09/29/2020    BILITOT 0.4 10/01/2019    BILITOT 0.3 12/13/2018    ALKPHOS 82 09/29/2020    ALKPHOS 69 10/01/2019    ALKPHOS 73 12/13/2018     BNP: No results found for: BNP  Lipids:   Lab Results   Component Value Date    CHOL 217 (H) 09/29/2020    HDL 64 09/29/2020     INR: No results found for: INR  URINE:   Lab Results   Component Value Date    PROTUR 29.0 09/29/2020     Lab Results   Component Value Date    NITRU NEGATIVE 11/15/2021    COLORU YELLOW 11/15/2021    PHUR 5.5 11/15/2021    LABCAST 4-8 HYALINE 04/20/2021    LABCAST NONE SEEN 04/20/2021    WBCUA 5-9 11/15/2021    RBCUA 0-2 11/15/2021    YEAST NONE SEEN 11/15/2021    BACTERIA NONE SEEN 11/15/2021    SPECGRAV 1.017 04/20/2021    LEUKOCYTESUR TRACE 11/15/2021    UROBILINOGEN 0.2 11/15/2021    BILIRUBINUR NEGATIVE 11/15/2021    BLOODU NEGATIVE 11/15/2021    GLUCOSEU NEGATIVE 11/15/2021    KETUA NEGATIVE 11/15/2021      Microalbumen/Creatinine ratio:  No components found for: RUCREAT        Impression/Plan:   1. Microalbuminuria: likely from obesity. Continue lisinopril, weight loss. 2. CKD II. Renal US unremarkable  3. HTN: lisinopril 5 mg. Monitor Bps at home. reminded pt of teratogenicity of lisinopril. She is taking birth control pills. Advised her to notify me if any chance she could get pregnant so we can stop it  4. PCOS  5. Obesity    Bloodwork and medications were reviewed and plan of care discussed with the patient. Return to clinic in 1 year  or sooner if the need arises.       Leonel Angeles DO Luz Elena  [facility]

## 2021-12-01 ENCOUNTER — NURSE ONLY (OUTPATIENT)
Dept: FAMILY MEDICINE CLINIC | Age: 26
End: 2021-12-01
Payer: COMMERCIAL

## 2021-12-01 DIAGNOSIS — R51.9 ACUTE INTRACTABLE HEADACHE, UNSPECIFIED HEADACHE TYPE: Primary | ICD-10-CM

## 2021-12-01 LAB
Lab: NORMAL
PERFORMING INSTRUMENT: NORMAL
QC PASS/FAIL: NORMAL
SARS-COV-2, POC: NORMAL

## 2021-12-01 PROCEDURE — 87426 SARSCOV CORONAVIRUS AG IA: CPT | Performed by: FAMILY MEDICINE

## 2021-12-03 LAB
SARS-COV-2: NOT DETECTED
SOURCE: NORMAL

## 2021-12-07 ENCOUNTER — TELEPHONE (OUTPATIENT)
Dept: INTERNAL MEDICINE CLINIC | Age: 26
End: 2021-12-07

## 2021-12-09 ENCOUNTER — PATIENT MESSAGE (OUTPATIENT)
Dept: FAMILY MEDICINE CLINIC | Age: 26
End: 2021-12-09

## 2021-12-09 NOTE — TELEPHONE ENCOUNTER
From: Lyn Farr  To: Dr. Tilley Montana: 12/9/2021 11:55 AM EST  Subject: At-home [employer] Covid test    I took an at-home Covid test because I have a headache, sore throat, cough and my ears were bothering me. I stayed home because I was showing these symptoms. This test shows I am negative. I took a rapid test at your facility last week and it was negative too. I believe it could be a common cold or allergies.

## 2022-01-17 ENCOUNTER — NURSE ONLY (OUTPATIENT)
Dept: LAB | Age: 27
End: 2022-01-17

## 2022-01-24 ENCOUNTER — PATIENT MESSAGE (OUTPATIENT)
Dept: FAMILY MEDICINE CLINIC | Age: 27
End: 2022-01-24

## 2022-01-24 ENCOUNTER — E-VISIT (OUTPATIENT)
Dept: FAMILY MEDICINE CLINIC | Age: 27
End: 2022-01-24
Payer: COMMERCIAL

## 2022-01-24 DIAGNOSIS — L70.0 ACNE VULGARIS: Primary | ICD-10-CM

## 2022-01-24 PROCEDURE — 99421 OL DIG E/M SVC 5-10 MIN: CPT | Performed by: FAMILY MEDICINE

## 2022-01-24 NOTE — TELEPHONE ENCOUNTER
From: Robin Ng  To: Dr. Sosa Drillin2022 10:45 AM EST  Subject: Hormone Balancing and Acne    Good morning. I have been experiencing hormonal acne for several months. My face is very tender and I dont know how to fix it. I went to OB last week and the nurse practitioner told me as long as my periods were normal that I didnt need to take birth control. Is there any hormones therapy/supplements I could take for my acne and metabolism?

## 2022-01-25 PROBLEM — L70.0 ACNE VULGARIS: Status: ACTIVE | Noted: 2022-01-25

## 2022-01-25 RX ORDER — TRETINOIN 0.5 MG/G
CREAM TOPICAL
Qty: 45 G | Refills: 3 | Status: SHIPPED | OUTPATIENT
Start: 2022-01-25 | End: 2022-01-26 | Stop reason: SDUPTHER

## 2022-01-25 NOTE — PROGRESS NOTES
Will have pt add retin-a at night  She will trial this for 3 months. If not improving after that, will need to f/u in office. 5 min spent. Diagnosis Orders   1.  Acne vulgaris  tretinoin (RETIN-A) 0.05 % cream

## 2022-01-26 ENCOUNTER — PATIENT MESSAGE (OUTPATIENT)
Dept: FAMILY MEDICINE CLINIC | Age: 27
End: 2022-01-26

## 2022-01-26 ENCOUNTER — TELEPHONE (OUTPATIENT)
Dept: FAMILY MEDICINE CLINIC | Age: 27
End: 2022-01-26

## 2022-01-26 ENCOUNTER — HOSPITAL ENCOUNTER (EMERGENCY)
Age: 27
Discharge: HOME | End: 2022-01-26
Attending: FAMILY MEDICINE
Payer: COMMERCIAL

## 2022-01-26 VITALS
OXYGEN SATURATION: 100 % | BODY MASS INDEX: 41.78 KG/M2 | HEART RATE: 97 BPM | TEMPERATURE: 98.4 F | DIASTOLIC BLOOD PRESSURE: 74 MMHG | WEIGHT: 260 LBS | HEIGHT: 66 IN | SYSTOLIC BLOOD PRESSURE: 126 MMHG | RESPIRATION RATE: 20 BRPM

## 2022-01-26 DIAGNOSIS — R11.0 NAUSEA: ICD-10-CM

## 2022-01-26 DIAGNOSIS — L70.0 ACNE VULGARIS: ICD-10-CM

## 2022-01-26 DIAGNOSIS — B34.9 ACUTE VIRAL SYNDROME: Primary | ICD-10-CM

## 2022-01-26 LAB
CYTOLOGY THIN PREP PAP: NORMAL
FLU A ANTIGEN: NEGATIVE
FLU B ANTIGEN: NEGATIVE
SARS-COV-2, NAAT: NOT  DETECTED

## 2022-01-26 PROCEDURE — 99282 EMERGENCY DEPT VISIT SF MDM: CPT

## 2022-01-26 PROCEDURE — 87635 SARS-COV-2 COVID-19 AMP PRB: CPT

## 2022-01-26 PROCEDURE — 87804 INFLUENZA ASSAY W/OPTIC: CPT

## 2022-01-26 PROCEDURE — 6370000000 HC RX 637 (ALT 250 FOR IP): Performed by: FAMILY MEDICINE

## 2022-01-26 RX ORDER — ONDANSETRON 4 MG/1
4 TABLET, ORALLY DISINTEGRATING ORAL 4 TIMES DAILY PRN
Qty: 20 TABLET | Refills: 0 | Status: SHIPPED | OUTPATIENT
Start: 2022-01-26

## 2022-01-26 RX ORDER — ONDANSETRON 4 MG/1
4 TABLET, ORALLY DISINTEGRATING ORAL ONCE
Status: COMPLETED | OUTPATIENT
Start: 2022-01-26 | End: 2022-01-26

## 2022-01-26 RX ORDER — TRETINOIN 0.5 MG/G
CREAM TOPICAL
Qty: 20 G | Refills: 11 | Status: SHIPPED | OUTPATIENT
Start: 2022-01-26 | End: 2022-01-30

## 2022-01-26 RX ADMIN — ONDANSETRON 4 MG: 4 TABLET, ORALLY DISINTEGRATING ORAL at 17:53

## 2022-01-26 ASSESSMENT — ENCOUNTER SYMPTOMS
SHORTNESS OF BREATH: 0
NAUSEA: 1
ABDOMINAL PAIN: 0
WHEEZING: 0
DIARRHEA: 0
BACK PAIN: 0
COUGH: 0
VOMITING: 0
SORE THROAT: 0

## 2022-01-26 ASSESSMENT — PAIN DESCRIPTION - DESCRIPTORS: DESCRIPTORS: ACHING

## 2022-01-26 ASSESSMENT — PAIN DESCRIPTION - LOCATION: LOCATION: HEAD

## 2022-01-26 ASSESSMENT — PAIN DESCRIPTION - PAIN TYPE: TYPE: ACUTE PAIN

## 2022-01-26 ASSESSMENT — PAIN DESCRIPTION - FREQUENCY: FREQUENCY: CONTINUOUS

## 2022-01-26 NOTE — TELEPHONE ENCOUNTER
Please call pt and if still having these symptoms, I would advise ER today  Let me know if questions, thanks!

## 2022-01-26 NOTE — Clinical Note
Rosalinda Carr was seen and treated in our emergency department on 1/26/2022. She may return to work on 01/31/2022. If you have any questions or concerns, please don't hesitate to call.       Mariano Marmolejo MD

## 2022-01-26 NOTE — TELEPHONE ENCOUNTER
Cannot do an appeal until we dot the PA on the new RX . Spoke to pharmacy , the are faxing new PA to office.

## 2022-01-26 NOTE — TELEPHONE ENCOUNTER
Done  Please update pharmacy/pt    ASSESSMENT & PLAN  1. Acne vulgaris    - tretinoin (RETIN-A) 0.05 % cream; Apply topically nightly. Dispense: 20 g;  Refill: 11

## 2022-01-26 NOTE — TELEPHONE ENCOUNTER
PA DENIAL tretinoin (RETIN-A) 0.05 % cream 01/26/22. Medication quantity limited to 20 grams per fill per plan . See attached for details .

## 2022-01-26 NOTE — ED PROVIDER NOTES
Loi Carrizales COMPLAINT       Chief Complaint   Patient presents with    Headache    Nausea       Nurses Notes reviewed and I agree except as noted in the HPI. HISTORY OF PRESENT ILLNESS    Evin Stack is a 27 y.o. female who presents for evaluation of URI symptoms. Patient feels weak and developed headache, nausea, lightheadedness, and some congestion this morning. Patient states that she was exposed to a coworker who tested positive for Covid recently. Patient has received COVID-19 vaccination. She has not had a cough, chest pain, or shortness of breath. Patient has received both her COVID-19 and influenza vaccinations. REVIEW OF SYSTEMS     Review of Systems   Constitutional: Negative for activity change, appetite change, chills and fever. HENT: Positive for congestion. Negative for ear pain and sore throat. Respiratory: Negative for cough, shortness of breath and wheezing. Cardiovascular: Negative for chest pain and leg swelling. Gastrointestinal: Positive for nausea. Negative for abdominal pain, diarrhea and vomiting. Genitourinary: Negative for dysuria, flank pain and hematuria. Musculoskeletal: Negative for arthralgias, back pain, gait problem and neck pain. Skin: Negative for rash and wound. Neurological: Positive for weakness, light-headedness and headaches. Psychiatric/Behavioral: Negative for agitation and hallucinations. The patient is not nervous/anxious. PAST MEDICAL HISTORY    has a past medical history of Allergic rhinitis, History of pneumonia, Hypertension, essential, Irritable bowel syndrome with diarrhea, Major depression, Mild intermittent asthma, Obesity (BMI 30-39.9), and PCOS (polycystic ovarian syndrome). SURGICAL HISTORY      has a past surgical history that includes Tonsillectomy and [place] tooth extraction.     CURRENT MEDICATIONS       Discharge Medication List as of 1/26/2022  6:04 PM      CONTINUE these medications which have NOT CHANGED    Details   tretinoin (RETIN-A) 0.05 % cream Apply topically nightly., Disp-20 g, R-11, Normal      azelastine (OPTIVAR) 0.05 % ophthalmic solution Historical Med      lisinopril (PRINIVIL;ZESTRIL) 5 MG tablet Take 1 tablet by mouth daily, Disp-90 tablet, R-3Normal      hydrOXYzine (ATARAX) 25 MG tablet Take 1 tablet by mouth every 8 hours as needed for Anxiety, Disp-90 tablet, R-0Normal      fexofenadine (EQ ALLERGY RELIEF) 180 MG tablet TAKE 1 TABLET BY MOUTH ONCE DAILY, Disp-90 tablet, R-3Normal      fluticasone (FLONASE) 50 MCG/ACT nasal spray 2 sprays by Nasal route daily, Disp-3 Bottle, R-3Normal      albuterol sulfate HFA (VENTOLIN HFA) 108 (90 Base) MCG/ACT inhaler Inhale 2 puffs into the lungs every 4 hours as needed for Wheezing or Shortness of Breath, Disp-2 Inhaler,R-3Normal      Norgestimate-Eth Estradiol (SPRINTEC 28 PO) Take by mouthHistorical Med      Spacer/Aero-Holding Chambers ROCIO DAILY PRN Starting 11/26/2014, Until Discontinued, Disp-1 Device, R-0, Print             ALLERGIES     has No Known Allergies. FAMILY HISTORY     She indicated that her mother is alive. She indicated that her father is alive. She indicated that the status of her neg hx is unknown.   family history includes Diabetes in her mother; High Blood Pressure in her father and mother; High Cholesterol in her father. SOCIAL HISTORY      reports that she has never smoked. She has never used smokeless tobacco. She reports that she does not drink alcohol and does not use drugs. PHYSICAL EXAM     INITIAL VITALS:  height is 5' 6\" (1.676 m) and weight is 260 lb (117.9 kg). Her oral temperature is 98.4 °F (36.9 °C). Her blood pressure is 126/74 and her pulse is 97. Her respiration is 20 and oxygen saturation is 100%. Physical Exam  Vitals and nursing note reviewed. Constitutional:       General: She is not in acute distress.      Appearance: She is well-developed. HENT:      Head: Normocephalic and atraumatic. Eyes:      General:         Right eye: No discharge. Left eye: No discharge. Conjunctiva/sclera: Conjunctivae normal.   Cardiovascular:      Rate and Rhythm: Normal rate and regular rhythm. Heart sounds: Normal heart sounds. Pulmonary:      Effort: Pulmonary effort is normal.      Breath sounds: Normal breath sounds. Abdominal:      General: Bowel sounds are normal. There is no distension. Palpations: Abdomen is soft. There is no mass. Tenderness: There is no abdominal tenderness. Skin:     General: Skin is warm and dry. Neurological:      General: No focal deficit present. Mental Status: She is alert and oriented to person, place, and time. Psychiatric:         Mood and Affect: Mood normal.         Behavior: Behavior normal.         DIFFERENTIAL DIAGNOSIS:   COVID-19 infection, influenza, viral URI    DIAGNOSTIC RESULTS         LABS:   Labs Reviewed   RAPID INFLUENZA A/B ANTIGENS   COVID-19, RAPID       DEPARTMENT COURSE:   Vitals:    Vitals:    01/26/22 1703 01/26/22 1730   BP: (!) 151/93 126/74   Pulse: 97    Resp: 20    Temp: 98.4 °F (36.9 °C)    TempSrc: Oral    SpO2: 100%    Weight: 260 lb (117.9 kg)    Height: 5' 6\" (1.676 m)        MDM:  Patient presents for evaluation of acute viral syndrome symptoms. She does test negative for Covid 19 infection and influenza today. She is recommended to keep well-hydrated and she is given a dose of Zofran and prescribed this medication. She will follow-up for any symptom worsening or for evaluation concerning symptoms. Otherwise watchful waiting for symptom resolution    CRITICAL CARE:   None    CONSULTS:  None    PROCEDURES:  None    FINAL IMPRESSION      1. Acute viral syndrome    2.  Nausea          DISPOSITION/PLAN   Discharge    PATIENT REFERRED TO:  [address]  [address] [facility]  [phone]    Schedule an appointment as soon as possible for a visit   As needed      DISCHARGEMEDICATIONS:  Discharge Medication List as of 1/26/2022  6:04 PM      START taking these medications    Details   ondansetron (ZOFRAN ODT) 4 MG disintegrating tablet Take 1 tablet by mouth 4 times daily as needed for Nausea or Vomiting, Disp-20 tablet, R-0Normal             (Please note that portions of this note were completedwith a voice recognition program.  Efforts were made to edit the dictations but occasionally words are mis-transcribed.)    MD Emile Sales MD  01/26/22 3793

## 2022-01-28 ENCOUNTER — TELEPHONE (OUTPATIENT)
Dept: FAMILY MEDICINE CLINIC | Age: 27
End: 2022-01-28

## 2022-01-28 DIAGNOSIS — L70.0 ACNE VULGARIS: Primary | ICD-10-CM

## 2022-01-28 NOTE — TELEPHONE ENCOUNTER
PA  APPEAL DENIAL tretinoin (RETIN-A) 0.05 % cream  01/28/22. Pt must try and fail the following : topical clindamycin, benzoyl peroxide, sodim sulfacetamide.      See attached for details

## 2022-01-30 RX ORDER — BENZOYL PEROXIDE 5 G/100G
LIQUID TOPICAL
Qty: 236 ML | Refills: 3 | Status: SHIPPED | OUTPATIENT
Start: 2022-01-30

## 2022-01-30 RX ORDER — CLINDAMYCIN PHOSPHATE 10 UG/ML
LOTION TOPICAL
Qty: 60 G | Refills: 2 | Status: SHIPPED | OUTPATIENT
Start: 2022-01-30 | End: 2022-03-01

## 2022-02-17 DIAGNOSIS — J45.20 MILD INTERMITTENT ASTHMA WITHOUT COMPLICATION: ICD-10-CM

## 2022-02-17 RX ORDER — ALBUTEROL SULFATE 90 UG/1
2 AEROSOL, METERED RESPIRATORY (INHALATION) EVERY 4 HOURS PRN
Qty: 2 EACH | Refills: 3 | Status: SHIPPED | OUTPATIENT
Start: 2022-02-17

## 2022-02-17 NOTE — TELEPHONE ENCOUNTER
From: Sylvia De La Rosa  To: Dr. Rosenthal Button: 2/16/2022  6:23 PM EST  Subject: Inhaler    Could I get a refill on my inhaler    Please approve or refuse Rx request:  Requested Prescriptions     Pending Prescriptions Disp Refills    albuterol sulfate HFA (VENTOLIN HFA) 108 (90 Base) MCG/ACT inhaler       Sig: Inhale 2 puffs into the lungs every 4 hours as needed for Wheezing or Shortness of Breath       Next appointment:  Visit date not found

## 2022-03-10 ENCOUNTER — PATIENT MESSAGE (OUTPATIENT)
Dept: FAMILY MEDICINE CLINIC | Age: 27
End: 2022-03-10

## 2022-03-10 NOTE — TELEPHONE ENCOUNTER
From: Chiquita Thayer  To: Dr. Lara Route: 3/10/2022 11:42 AM EST  Subject: Flu     I came down with the flu yesterday. Luz Maria experienced nausea, earache, headache, sore throat (especially when I swallow), fatigue, fever, and nasal congestion. What do you suggest I take for it? I know my immune system was down from last week with the other virus and now I came down with this illness.

## 2022-03-12 ENCOUNTER — HOSPITAL ENCOUNTER (OUTPATIENT)
Age: 27
Setting detail: SPECIMEN
Discharge: HOME | End: 2022-03-12

## 2022-03-13 LAB
CULTURE: ABNORMAL
SPECIMEN DESCRIPTION: ABNORMAL

## 2022-03-15 ENCOUNTER — OFFICE VISIT (OUTPATIENT)
Dept: FAMILY MEDICINE CLINIC | Age: 27
End: 2022-03-15
Payer: COMMERCIAL

## 2022-03-15 VITALS
OXYGEN SATURATION: 99 % | HEART RATE: 77 BPM | BODY MASS INDEX: 40.65 KG/M2 | WEIGHT: 259 LBS | RESPIRATION RATE: 12 BRPM | TEMPERATURE: 98.1 F | HEIGHT: 67 IN | DIASTOLIC BLOOD PRESSURE: 62 MMHG | SYSTOLIC BLOOD PRESSURE: 124 MMHG

## 2022-03-15 DIAGNOSIS — J06.9 ACUTE UPPER RESPIRATORY INFECTION: Primary | ICD-10-CM

## 2022-03-15 DIAGNOSIS — F41.9 ANXIETY: ICD-10-CM

## 2022-03-15 DIAGNOSIS — H69.83 DYSFUNCTION OF BOTH EUSTACHIAN TUBES: ICD-10-CM

## 2022-03-15 DIAGNOSIS — E66.9 OBESITY (BMI 30-39.9): ICD-10-CM

## 2022-03-15 DIAGNOSIS — J45.20 MILD INTERMITTENT ASTHMA WITHOUT COMPLICATION: Chronic | ICD-10-CM

## 2022-03-15 DIAGNOSIS — I10 HYPERTENSION, ESSENTIAL: ICD-10-CM

## 2022-03-15 DIAGNOSIS — R80.9 MICROALBUMINURIA: ICD-10-CM

## 2022-03-15 DIAGNOSIS — F32.5 MAJOR DEPRESSIVE DISORDER WITH SINGLE EPISODE, IN FULL REMISSION (HCC): ICD-10-CM

## 2022-03-15 LAB
Lab: NORMAL
QC PASS/FAIL: NORMAL
SARS-COV-2 RDRP RESP QL NAA+PROBE: NEGATIVE

## 2022-03-15 PROCEDURE — 1036F TOBACCO NON-USER: CPT | Performed by: FAMILY MEDICINE

## 2022-03-15 PROCEDURE — G8427 DOCREV CUR MEDS BY ELIG CLIN: HCPCS | Performed by: FAMILY MEDICINE

## 2022-03-15 PROCEDURE — G8484 FLU IMMUNIZE NO ADMIN: HCPCS | Performed by: FAMILY MEDICINE

## 2022-03-15 PROCEDURE — 99214 OFFICE O/P EST MOD 30 MIN: CPT | Performed by: FAMILY MEDICINE

## 2022-03-15 PROCEDURE — G8417 CALC BMI ABV UP PARAM F/U: HCPCS | Performed by: FAMILY MEDICINE

## 2022-03-15 PROCEDURE — 87635 SARS-COV-2 COVID-19 AMP PRB: CPT | Performed by: FAMILY MEDICINE

## 2022-03-15 NOTE — PROGRESS NOTES
Chief Complaint   Patient presents with    Cough    Otalgia    Asthma    Urinary Tract Infection    Follow-up     Depression/anxiety    Obesity    Hypertension       History obtained from the patient. SUBJECTIVE:  Wm Perez is a 27 y.o. female that presents today for     -URI type sxs:   Started late last wk  Nasal congestion, drainage and ear pain  Seen at [facility] over weekend  Given zyrtec d, robitussin and tessalon  Is improving, but having ear pain yet  Mild wheezing yesterday, better today  Used alb once, typically needs rarely  Wanted ears checked    Fever - No  Runny nose or congestion -  Yes   Cough -  Yes  Sore throat -  Yes  Headache, fatigue, joint pains, muscle aches -  No  Double Sickening - No  Shortness of breath/Wheezing? -  No  Nausea/Vomiting/Diarrhea? No  Sick contacts - No  Maxillary Tooth Pain -  No  Treatment tried and response - as above      -Urinary Symptoms:     HPI:  Started last wk  Seen [facility]  On bactrim DS  sxs improving  No hematuria, flank pain or fevers      -Depressed Mood/anxiety PRIOR VISIT:    HPI:  Inc depressive sxs the last several months  Lot of stress  In ED last night for panic attack  Has occ vague thoughts of suicide  No plan  Would never do it  Here with mom  Never treated  Willing to start meds  Working with her [employer] for therapy    Depressed Mood? yes -   Anhedonia? yes -   Appetite changes? yes -   Sleep disturbances? yes -   Feelings of guilt? yes -   Decreased energy? yes -   Impaired concentration?  yes -   Substance abuse? no    Suicidal/Homicidal Ideation? no      UPDATE LAST VISIT:   Doing better  Able to wean off lexapro 2 to 3 wks ago  So far doing ok w/o it  Moods better and stable thus far  Less stress  Wants to con't off for now     UPDATE TODAY:   Off lexapro for several months now  Moods remain stable and anxiety controlled  No SI/HI       -Obesity:  Working on diet changes/wt loss    Wt Readings from Last 3 Encounters:   03/15/22 259 lb (117.5 kg)   01/26/22 260 lb (117.9 kg)   05/04/21 250 lb (113.4 kg)       -HTN/Microalbumin:    HPI:  nephro started pt on lisinopril  BP's were running high  Better now  Pt aware of teratogenic effects if were to become pregnant  Off OCP for now, but will resume soon once f/u with GYN    Taking meds as prescribed ?: yes  Tolerating well ?: yes  Side Effects ?: denies  BP at home ?: <140/90  Working on TLCS ?: yes  Chest Pain/SOB/Palpitations? denies    BP Readings from Last 3 Encounters:   03/15/22 124/62   01/26/22 126/74   05/04/21 132/78       Age/Gender Health Maintenance    Lipid -   Lab Results   Component Value Date    CHOL 217 (H) 09/29/2020    CHOL 214 (H) 12/13/2018     Lab Results   Component Value Date    TRIG 120 09/29/2020    TRIG 103 12/13/2018     Lab Results   Component Value Date    HDL 64 09/29/2020    HDL 56 12/13/2018     Lab Results   Component Value Date    LDLCALC 129 09/29/2020    [address] 137 12/13/2018     No results found for: LABVLDL, VLDL  No results found for: CHOLHDLRATIO      DM Screen -   Lab Results   Component Value Date    GLUCOSE 80 11/15/2021    GLUCOSE 97 10/19/2011       Colon Cancer Screening - NEG OCT 2019, repeat age 50. Per GI, Dr. Rashard Aldridge. Lung Cancer Screening (Age 54 to [de-identified] with 27 pack year hx, current smoker or quit within past 15 years) - never smoker    Tetanus - UTD SEPT 2020  Influenza Vaccine - UTD FALL 2021  Pneumonia Vaccine - UTD PPV 23 JAN 2018  Zoster - age 48     Breast Cancer Screening - age 44-55  Cervical Cancer Screening - NEG June 2019  Osteoporosis Screening - age 74      Current Outpatient Medications   Medication Sig Dispense Refill    albuterol sulfate HFA (VENTOLIN HFA) 108 (90 Base) MCG/ACT inhaler Inhale 2 puffs into the lungs every 4 hours as needed for Wheezing or Shortness of Breath 2 each 3    benzoyl peroxide (KP BENZOYL PEROXIDE WASH) 5 % external liquid Apply topically 2 times daily.  236 mL 3    ondansetron (ZOFRAN ODT) 4 MG disintegrating tablet Take 1 tablet by mouth 4 times daily as needed for Nausea or Vomiting 20 tablet 0    azelastine (OPTIVAR) 0.05 % ophthalmic solution       lisinopril (PRINIVIL;ZESTRIL) 5 MG tablet Take 1 tablet by mouth daily 90 tablet 3    hydrOXYzine (ATARAX) 25 MG tablet Take 1 tablet by mouth every 8 hours as needed for Anxiety 90 tablet 0    fexofenadine (EQ ALLERGY RELIEF) 180 MG tablet TAKE 1 TABLET BY MOUTH ONCE DAILY 90 tablet 3    fluticasone (FLONASE) 50 MCG/ACT nasal spray 2 sprays by Nasal route daily 3 Bottle 3    Spacer/Aero-Holding Chambers ROCIO 1 Device by Does not apply route daily as needed. 1 Device 0     No current facility-administered medications for this visit. No orders of the defined types were placed in this encounter. All medications reviewed and reconciled, including OTC and herbal medications. Updated list given to patient. Patient Active Problem List    Diagnosis Date Noted    Acne vulgaris 01/25/2022    Major depression     Hypertension, essential     Irritable bowel syndrome with diarrhea     Microalbuminuria     Allergic rhinitis     History of pneumonia     Asthma, mild intermittent     PCOS (polycystic ovarian syndrome)     Obesity (BMI 30-39. 9)        Past Medical History:   Diagnosis Date    Allergic rhinitis     Asthma, mild intermittent     History of pneumonia     Hypertension, essential     Irritable bowel syndrome with diarrhea     Major depression     Obesity (BMI 30-39. 9)     PCOS (polycystic ovarian syndrome)        Past Surgical History:   Procedure Laterality Date    TONSILLECTOMY      adnoids    WISDOM TOOTH EXTRACTION         No Known Allergies      Social History     Tobacco Use    Smoking status: Never Smoker    Smokeless tobacco: Never Used   Substance Use Topics    Alcohol use: No       Family History   Problem Relation Age of Onset    Diabetes Mother     High Blood Pressure Mother     High Blood Pressure Father     High Cholesterol Father     Colon Cancer Neg Hx     Breast Cancer Neg Hx          I have reviewed the patient's past medical history, past surgical history, allergies, medications, social and family history and I have made updates where appropriate. Review of Systems  Positive responses are highlighted in bold    Constitutional:  Fever, Chills, Night Sweats, Fatigue, Unexpected changes in weight  HENT:  Ear pain, Tinnitus, Nosebleeds, Trouble swallowing, Hearing loss, Sore throat  Cardiovascular:  Chest Pain, Palpitations, Orthopnea, Paroxysmal Nocturnal Dyspnea  Respiratory:  Cough, Wheezing, Shortness of breath, Chest tightness, Apnea  Gastrointestinal:  Nausea, Vomiting, Diarrhea, Constipation, Heartburn, Blood in stool  Genitourinary:  Difficulty or painful urination, Flank pain, Change in frequency, Urgency  Skin:  Color change, Rash, Itching, Wound  Musculoskeletal:  Joint pain, Back pain, Gait problems, Joint swelling, Myalgias  Neurological:  Dizziness, Headaches, Presyncope, Numbness, Seizures, Tremors  Endocrine:  Heat Intolerance, Cold Intolerance, Polydipsia, Polyphagia, Polyuria      PHYSICAL EXAM:  Vitals:    03/15/22 1351   BP: 124/62   Pulse: 77   Resp: 12   Temp: 98.1 °F (36.7 °C)   TempSrc: Oral   SpO2: 99%   Weight: 259 lb (117.5 kg)   Height: 5' 7\" (1.702 m)     Body mass index is 40.57 kg/m². Pain Score:   2 (ears)    VS Reviewed  General Appearance: A&O x 3, No acute distress,well developed and well- nourished  Eyes: pupils equal, round, and reactive to light, extraocular eye movements intact, conjunctivae and eye lids without erythema  ENT: bilateral TM fluid noted, neck without nodes, throat normal without erythema or exudate, sinuses nontender, post nasal drip noted, nasal mucosa congested and Oropharynx clear, without erythema, exudate, or thrush.   Neck: supple and non-tender without mass, no thyromegaly or thyroid nodules, no cervical lymphadenopathy  Pulmonary/Chest: clear to auscultation bilaterally- no wheezes, rales or rhonchi, normal air movement, no respiratory distress or retractions  Cardiovascular: S1 and S2 auscultated w/ RRR. No murmurs, rubs, clicks, or gallops, distal pulses intact. Abdomen: soft, non-tender, non-distended, bowel sounds physiologic,  no rebound or guarding, no masses or hernias noted. Liver and spleen without enlargement. Extremities: no cyanosis, clubbing or edema of the lower extremities. Skin: warm and dry, no rash or erythema      ASSESSMENT & PLAN  1. Acute upper respiratory infection    Viral URI with neg covid  Some ET Dysfxn bilat  con't zyrtec D  Resume flonase  Fluids and rest  F/u if no better  Reviewed ER precautions, pt understands. - POCT COVID-19 Rapid, NAAT    2. Dysfunction of both eustachian tubes    As per # 1    3. Mild intermittent asthma without complication    Stable  No s/s AE  con't prn alb    4. Major depressive disorder with single episode, in full remission ([address]    Stable off lexapro  Monitor sxs and f/u if depression/anxiety were to recur    5. Anxiety    As above    6. Obesity (BMI 30-39.9)    con't with wt loss strategies  Lost 7lbs from max wt the last 4 wks    7. Hypertension, essential    Stable  con't lisinopril  Pt aware of teratogenic risk  nephro had started this med    8. Microalbuminuria      DISPOSITION    Return in about 1 year (around 3/15/2023) for Asthma, BP's etc, sooner as needed. Kwesi Salazar released without restrictions. PATIENT COUNSELING    Barriers to learning and self management: none    Discussed use, benefit, and side effects of prescribed medications. Barriers to medication compliance addressed. All patient questions answered. Pt voiced understanding.        Electronically signed by Jaron Rao DO on 3/15/2022 at 2:15 PM

## 2022-03-15 NOTE — PATIENT INSTRUCTIONS
Patient Education        Eustachian Tube Problems: Care Instructions  Your Care Instructions     The eustachian (say \"you-STAY-shee-un\") tubes run between the inside of the ears and the throat. They keep air pressure stable in the ears. If your eustachian tubes become blocked, the air pressure in your ears changes. The fluids from a cold can clog eustachian tubes, causing pain in the ears. A quick change in air pressure can cause eustachian tubes to close up. This might happen when an airplane changes altitude or when a  goes up or down underwater. Eustachian tube problems often clear up on their own or after antibiotic treatment. If your tubes continue to be blocked, you may need surgery. Follow-up care is a key part of your treatment and safety. Be sure to make and go to all appointments, and call your doctor if you are having problems. It's also a good idea to know your test results and keep a list of the medicines you take. How can you care for yourself at home? · To ease ear pain, apply a warm washcloth or a heating pad set on low. There may be some drainage from the ear when the heat melts earwax. Put a cloth between the heat source and your skin. · If your doctor prescribed antibiotics, take them as directed. Do not stop taking them just because you feel better. You need to take the full course of antibiotics. · Your doctor may recommend over-the-counter medicine. Be safe with medicines. Oral or nasal decongestants may relieve ear pain. Avoid decongestants that are combined with antihistamines, which tend to cause more blockage. But if allergies seem to be the problem, your doctor may recommend a combination. Be careful with cough and cold medicines. Don't give them to children younger than 6, because they don't work for children that age and can even be harmful. For children 6 and older, always follow all the instructions carefully.  Make sure you know how much medicine to give and how long to use it. And use the dosing device if one is included. When should you call for help? Call your doctor now or seek immediate medical care if:    · You develop sudden, complete hearing loss.     · You have severe pain or feel dizzy.     · You have new or increasing pus or blood draining from your ear.     · You have redness, swelling, or pain around or behind the ear. Watch closely for changes in your health, and be sure to contact your doctor if:    · You do not get better after 2 weeks.     · You have any new symptoms, such as itching or a feeling of fullness in the ear. Where can you learn more? Go to https://[email or web address].[employer]. org and sign in to your [employer] account. Enter Y822 in the [employer] box to learn more about \"Eustachian Tube Problems: Care Instructions. \"     If you do not have an account, please click on the \"Sign Up Now\" link. Current as of: September 8, 2021               Content Version: 13.1  © 2006-2021 [employer] Inc[employer]. Care instructions adapted under license by [facility] ([facility]). If you have questions about a medical condition or this instruction, always ask your healthcare professional. [place] [address] any warranty or liability for your use of this information.

## 2022-03-24 ENCOUNTER — PATIENT MESSAGE (OUTPATIENT)
Dept: FAMILY MEDICINE CLINIC | Age: 27
End: 2022-03-24

## 2022-03-24 DIAGNOSIS — J01.90 ACUTE RHINOSINUSITIS: Primary | ICD-10-CM

## 2022-03-24 RX ORDER — AMOXICILLIN AND CLAVULANATE POTASSIUM 875; 125 MG/1; MG/1
1 TABLET, FILM COATED ORAL 2 TIMES DAILY
Qty: 20 TABLET | Refills: 0 | Status: SHIPPED | OUTPATIENT
Start: 2022-03-24 | End: 2022-04-03

## 2022-03-24 NOTE — TELEPHONE ENCOUNTER
From: Irena Phan  To: Dr. Gibbs Marcio: 3/24/2022 7:49 AM EDT  Subject: Coughing    I got a lot better over the weekend but my dry throat and coughing came back last night. Im starting to cough up mucus again. My ears have been popping all week too. I dont have any other symptoms besides the dry, sore cough. Ive taken all my medication besides my cough syrup. What do you recommend?

## 2022-04-14 DIAGNOSIS — J30.1 NON-SEASONAL ALLERGIC RHINITIS DUE TO POLLEN: Chronic | ICD-10-CM

## 2022-04-14 RX ORDER — FEXOFENADINE HCL 180 MG/1
TABLET ORAL
Qty: 90 TABLET | Refills: 3 | Status: SHIPPED | OUTPATIENT
Start: 2022-04-14

## 2022-04-14 RX ORDER — FEXOFENADINE HCL 180 MG/1
TABLET ORAL
Qty: 30 TABLET | Refills: 0 | OUTPATIENT
Start: 2022-04-14

## 2022-04-14 NOTE — TELEPHONE ENCOUNTER
Henrik LI, DO 8 hours ago (10:32 PM)           Could I get a refill on my fexofenadine tablets?  The pharmacy said I am out of refills.     Thank you!     -Tamara  Please approve or refuse Rx request:  Requested Prescriptions     Pending Prescriptions Disp Refills    fexofenadine (EQ ALLERGY RELIEF) 180 MG tablet 90 tablet 3       Next appointment:  Visit date not found

## 2022-04-14 NOTE — TELEPHONE ENCOUNTER
Recent Visits  Date Type Provider Dept   03/15/22 Office Visit DO [facility]   05/04/21 Office Visit DO [facility]   Showing recent visits within past 540 days with a meds authorizing provider and meeting all other requirements  Future Appointments  No visits were found meeting these conditions.   Showing future appointments within next 150 days with a meds authorizing provider and meeting all other requirements      Future Appointments   Date Time Provider Stanislav Tellez   12/5/2022  1:00 PM [id number] DO AYESHA Caldwell

## 2023-03-08 ENCOUNTER — OFFICE VISIT (OUTPATIENT)
Dept: FAMILY MEDICINE CLINIC | Age: 28
End: 2023-03-08
Payer: COMMERCIAL

## 2023-03-08 ENCOUNTER — PATIENT MESSAGE (OUTPATIENT)
Dept: FAMILY MEDICINE CLINIC | Age: 28
End: 2023-03-08

## 2023-03-08 VITALS
DIASTOLIC BLOOD PRESSURE: 78 MMHG | RESPIRATION RATE: 16 BRPM | WEIGHT: 259.6 LBS | TEMPERATURE: 97.9 F | HEIGHT: 67 IN | SYSTOLIC BLOOD PRESSURE: 120 MMHG | OXYGEN SATURATION: 99 % | HEART RATE: 94 BPM | BODY MASS INDEX: 40.74 KG/M2

## 2023-03-08 DIAGNOSIS — F41.9 ANXIETY: ICD-10-CM

## 2023-03-08 DIAGNOSIS — R80.9 MICROALBUMINURIA: ICD-10-CM

## 2023-03-08 DIAGNOSIS — I10 HYPERTENSION, ESSENTIAL: ICD-10-CM

## 2023-03-08 DIAGNOSIS — J01.90 ACUTE RHINOSINUSITIS: Primary | ICD-10-CM

## 2023-03-08 DIAGNOSIS — J45.20 MILD INTERMITTENT ASTHMA WITHOUT COMPLICATION: ICD-10-CM

## 2023-03-08 DIAGNOSIS — F32.5 MAJOR DEPRESSIVE DISORDER WITH SINGLE EPISODE, IN FULL REMISSION (HCC): ICD-10-CM

## 2023-03-08 DIAGNOSIS — E66.9 OBESITY (BMI 30-39.9): ICD-10-CM

## 2023-03-08 LAB
INFLUENZA A ANTIBODY: NEGATIVE
INFLUENZA B ANTIBODY: NEGATIVE
Lab: 0
QC PASS/FAIL: 0
SARS-COV-2 RDRP RESP QL NAA+PROBE: NEGATIVE

## 2023-03-08 PROCEDURE — 3074F SYST BP LT 130 MM HG: CPT | Performed by: FAMILY MEDICINE

## 2023-03-08 PROCEDURE — 87804 INFLUENZA ASSAY W/OPTIC: CPT | Performed by: FAMILY MEDICINE

## 2023-03-08 PROCEDURE — 99214 OFFICE O/P EST MOD 30 MIN: CPT | Performed by: FAMILY MEDICINE

## 2023-03-08 PROCEDURE — 3078F DIAST BP <80 MM HG: CPT | Performed by: FAMILY MEDICINE

## 2023-03-08 PROCEDURE — G8427 DOCREV CUR MEDS BY ELIG CLIN: HCPCS | Performed by: FAMILY MEDICINE

## 2023-03-08 PROCEDURE — G8482 FLU IMMUNIZE ORDER/ADMIN: HCPCS | Performed by: FAMILY MEDICINE

## 2023-03-08 PROCEDURE — 87635 SARS-COV-2 COVID-19 AMP PRB: CPT | Performed by: FAMILY MEDICINE

## 2023-03-08 PROCEDURE — 1036F TOBACCO NON-USER: CPT | Performed by: FAMILY MEDICINE

## 2023-03-08 PROCEDURE — G8417 CALC BMI ABV UP PARAM F/U: HCPCS | Performed by: FAMILY MEDICINE

## 2023-03-08 RX ORDER — BENZONATATE 100 MG/1
CAPSULE ORAL
Qty: 60 CAPSULE | Refills: 0 | Status: SHIPPED | OUTPATIENT
Start: 2023-03-08 | End: 2023-03-18

## 2023-03-08 RX ORDER — AMOXICILLIN AND CLAVULANATE POTASSIUM 875; 125 MG/1; MG/1
1 TABLET, FILM COATED ORAL 2 TIMES DAILY
Qty: 20 TABLET | Refills: 0 | Status: SHIPPED | OUTPATIENT
Start: 2023-03-08 | End: 2023-03-18

## 2023-03-08 RX ORDER — GUAIFENESIN 600 MG/1
600 TABLET, EXTENDED RELEASE ORAL 2 TIMES DAILY
Qty: 30 TABLET | Refills: 0 | Status: CANCELLED | OUTPATIENT
Start: 2023-03-08 | End: 2023-03-23

## 2023-03-08 SDOH — ECONOMIC STABILITY: INCOME INSECURITY: HOW HARD IS IT FOR YOU TO PAY FOR THE VERY BASICS LIKE FOOD, HOUSING, MEDICAL CARE, AND HEATING?: NOT HARD AT ALL

## 2023-03-08 SDOH — ECONOMIC STABILITY: FOOD INSECURITY: WITHIN THE PAST 12 MONTHS, THE FOOD YOU BOUGHT JUST DIDN'T LAST AND YOU DIDN'T HAVE MONEY TO GET MORE.: NEVER TRUE

## 2023-03-08 SDOH — ECONOMIC STABILITY: FOOD INSECURITY: WITHIN THE PAST 12 MONTHS, YOU WORRIED THAT YOUR FOOD WOULD RUN OUT BEFORE YOU GOT MONEY TO BUY MORE.: NEVER TRUE

## 2023-03-08 SDOH — ECONOMIC STABILITY: HOUSING INSECURITY
IN THE LAST 12 MONTHS, WAS THERE A TIME WHEN YOU DID NOT HAVE A STEADY PLACE TO SLEEP OR SLEPT IN A SHELTER (INCLUDING NOW)?: NO

## 2023-03-08 ASSESSMENT — PATIENT HEALTH QUESTIONNAIRE - PHQ9
5. POOR APPETITE OR OVEREATING: 0
SUM OF ALL RESPONSES TO PHQ QUESTIONS 1-9: 1
10. IF YOU CHECKED OFF ANY PROBLEMS, HOW DIFFICULT HAVE THESE PROBLEMS MADE IT FOR YOU TO DO YOUR WORK, TAKE CARE OF THINGS AT HOME, OR GET ALONG WITH OTHER PEOPLE: 0
4. FEELING TIRED OR HAVING LITTLE ENERGY: 0
3. TROUBLE FALLING OR STAYING ASLEEP: 1
7. TROUBLE CONCENTRATING ON THINGS, SUCH AS READING THE NEWSPAPER OR WATCHING TELEVISION: 0
SUM OF ALL RESPONSES TO PHQ9 QUESTIONS 1 & 2: 0
1. LITTLE INTEREST OR PLEASURE IN DOING THINGS: 0
6. FEELING BAD ABOUT YOURSELF - OR THAT YOU ARE A FAILURE OR HAVE LET YOURSELF OR YOUR FAMILY DOWN: 0
SUM OF ALL RESPONSES TO PHQ QUESTIONS 1-9: 1
9. THOUGHTS THAT YOU WOULD BE BETTER OFF DEAD, OR OF HURTING YOURSELF: 0
8. MOVING OR SPEAKING SO SLOWLY THAT OTHER PEOPLE COULD HAVE NOTICED. OR THE OPPOSITE, BEING SO FIGETY OR RESTLESS THAT YOU HAVE BEEN MOVING AROUND A LOT MORE THAN USUAL: 0
2. FEELING DOWN, DEPRESSED OR HOPELESS: 0

## 2023-03-08 NOTE — PROGRESS NOTES
Chief Complaint   Patient presents with    Cough    Follow-up     Issues noted below       History obtained from the patient. SUBJECTIVE:  Adeel Hong is a 24 y.o. female that presents today for     -URI type sxs:   Several days  Getting worse  Nasal congestion  Drainage  Cough  No inc wheezing  No SOB  Hasn't needed much albuterol    Fever - No  Runny nose or congestion -  Yes   Cough -  Yes  Sore throat -  Yes  Headache, fatigue, joint pains, muscle aches -  No  Double Sickening - Yes  Shortness of breath/Wheezing? -  No  Nausea/Vomiting/Diarrhea? No  Sick contacts - Yes  Maxillary Tooth Pain -  Yes  Treatment tried and response - otc meds no better        -Depressed Mood/anxiety PRIOR VISIT:    HPI:  Inc depressive sxs the last several months  Lot of stress  In ED last night for panic attack  Has occ vague thoughts of suicide  No plan  Would never do it  Here with mom  Never treated  Willing to start meds  Working with her [facility] for therapy    Depressed Mood? yes -   Anhedonia? yes -   Appetite changes? yes -   Sleep disturbances? yes -   Feelings of guilt? yes -   Decreased energy? yes -   Impaired concentration?  yes -   Substance abuse? no    Suicidal/Homicidal Ideation? no      UPDATE PRIOR VISIT:   Doing better  Able to wean off lexapro 2 to 3 wks ago  So far doing ok w/o it  Moods better and stable thus far  Less stress  Wants to con't off for now     UPDATE TODAY:   Off lexapro for over a year now  Moods remain stable and anxiety controlled  No SI/HI       -Obesity:  Working on diet changes/wt loss    Wt Readings from Last 3 Encounters:   03/08/23 259 lb 9.6 oz (117.8 kg)   03/15/22 259 lb (117.5 kg)   01/26/22 260 lb (117.9 kg)       -HTN/Microalbumin:    HPI:  nephro started pt on lisinopril  Pt weaned off a year or so ago  BP's <140/90  Due for f/u labs    Taking meds as prescribed ?: yes  Tolerating well ?: yes  Side Effects ?: denies  BP at home ?: <140/90  Working on TLCS ?: yes  Chest Pain/SOB/Palpitations? denies    BP Readings from Last 3 Encounters:   03/08/23 120/78   03/15/22 124/62   01/26/22 126/74       Age/Gender Health Maintenance    Lipid -   Lab Results   Component Value Date    CHOL 217 (H) 09/29/2020    CHOL 214 (H) 12/13/2018     Lab Results   Component Value Date    TRIG 120 09/29/2020    TRIG 103 12/13/2018     Lab Results   Component Value Date    HDL 64 09/29/2020    HDL 56 12/13/2018     Lab Results   Component Value Date    LDLCALC 129 09/29/2020    [address] 137 12/13/2018     No results found for: LABVLDL, VLDL  No results found for: CHOLHDLRATIO      DM Screen -   Lab Results   Component Value Date/Time    GLUCOSE 80 11/15/2021 01:04 PM    GLUCOSE 97 10/19/2011 03:15 PM       Colon Cancer Screening - NEG OCT 2019, repeat age 50. Per GI, Dr. Luisito Diop. Lung Cancer Screening - never smoker    Tetanus - UTD SEPT 2020  Influenza Vaccine - UTD FALL 2022  Pneumonia Vaccine - UTD PPV 23 JAN 2018  Zoster - age 50     Breast Cancer Screening - age 44-55  Cervical Cancer Screening - NEG JAN 2022  Osteoporosis Screening - age 74      Current Outpatient Medications   Medication Sig Dispense Refill    amoxicillin-clavulanate (AUGMENTIN) 875-125 MG per tablet Take 1 tablet by mouth 2 times daily for 10 days 20 tablet 0    benzonatate (TESSALON PERLES) 100 MG capsule Take 1 to 2 tablets by mouth every 8 hours as needed for cough. 60 capsule 0    fexofenadine (EQ ALLERGY RELIEF) 180 MG tablet TAKE 1 TABLET BY MOUTH ONCE DAILY 90 tablet 3    albuterol sulfate HFA (VENTOLIN HFA) 108 (90 Base) MCG/ACT inhaler Inhale 2 puffs into the lungs every 4 hours as needed for Wheezing or Shortness of Breath 2 each 3    benzoyl peroxide ( BENZOYL PEROXIDE WASH) 5 % external liquid Apply topically 2 times daily.  236 mL 3    ondansetron (ZOFRAN ODT) 4 MG disintegrating tablet Take 1 tablet by mouth 4 times daily as needed for Nausea or Vomiting 20 tablet 0    azelastine (OPTIVAR) 0.05 % ophthalmic solution       hydrOXYzine (ATARAX) 25 MG tablet Take 1 tablet by mouth every 8 hours as needed for Anxiety 90 tablet 0    fluticasone (FLONASE) 50 MCG/ACT nasal spray 2 sprays by Nasal route daily 3 Bottle 3    Spacer/Aero-Holding Chambers ROCIO 1 Device by Does not apply route daily as needed. 1 Device 0     No current facility-administered medications for this visit. Orders Placed This Encounter   Medications    amoxicillin-clavulanate (AUGMENTIN) 875-125 MG per tablet     Sig: Take 1 tablet by mouth 2 times daily for 10 days     Dispense:  20 tablet     Refill:  0    benzonatate (TESSALON PERLES) 100 MG capsule     Sig: Take 1 to 2 tablets by mouth every 8 hours as needed for cough. Dispense:  60 capsule     Refill:  0         All medications reviewed and reconciled, including OTC and herbal medications. Updated list given to patient. Patient Active Problem List    Diagnosis Date Noted    Acne vulgaris 01/25/2022    Major depression     Hypertension, essential     Irritable bowel syndrome with diarrhea     Microalbuminuria     Allergic rhinitis     History of pneumonia     Asthma, mild intermittent     PCOS (polycystic ovarian syndrome)     Obesity (BMI 30-39. 9)        Past Medical History:   Diagnosis Date    Allergic rhinitis     Asthma, mild intermittent     History of pneumonia     Hypertension, essential     Irritable bowel syndrome with diarrhea     Major depression     Obesity (BMI 30-39. 9)     PCOS (polycystic ovarian syndrome)        Past Surgical History:   Procedure Laterality Date    TONSILLECTOMY      adnoids    WISDOM TOOTH EXTRACTION         No Known Allergies      Social History     Tobacco Use    Smoking status: Never    Smokeless tobacco: Never   Substance Use Topics    Alcohol use: No       Family History   Problem Relation Age of Onset    Diabetes Mother     High Blood Pressure Mother     High Blood Pressure Father     High Cholesterol Father     Colon Cancer Neg Hx     Breast Cancer Neg Hx          I have reviewed the patient's past medical history, past surgical history, allergies, medications, social and family history and I have made updates where appropriate. Review of Systems  Positive responses are highlighted in bold    Constitutional:  Fever, Chills, Night Sweats, Fatigue, Unexpected changes in weight  HENT:  Ear pain, Tinnitus, Nosebleeds, Trouble swallowing, Hearing loss, Sore throat  Cardiovascular:  Chest Pain, Palpitations, Orthopnea, Paroxysmal Nocturnal Dyspnea  Respiratory:  Cough, Wheezing, Shortness of breath, Chest tightness, Apnea  Gastrointestinal:  Nausea, Vomiting, Diarrhea, Constipation, Heartburn, Blood in stool  Genitourinary:  Difficulty or painful urination, Flank pain, Change in frequency, Urgency  Skin:  Color change, Rash, Itching, Wound  Musculoskeletal:  Joint pain, Back pain, Gait problems, Joint swelling, Myalgias  Neurological:  Dizziness, Headaches, Presyncope, Numbness, Seizures, Tremors  Endocrine:  Heat Intolerance, Cold Intolerance, Polydipsia, Polyphagia, Polyuria      PHYSICAL EXAM:  Vitals:    03/08/23 1338   BP: 120/78   Pulse: 94   Resp: 16   Temp: 97.9 °F (36.6 °C)   SpO2: 99%   Weight: 259 lb 9.6 oz (117.8 kg)   Height: 5' 7\" (1.702 m)     Body mass index is 40.66 kg/m². VS Reviewed  General Appearance: A&O x 3, No acute distress,well developed and well- nourished  Eyes: pupils equal, round, and reactive to light, extraocular eye movements intact, conjunctivae and eye lids without erythema  ENT: bilateral TM normal without fluid or infection, neck without nodes, throat normal without erythema or exudate, sinuses nontender, post nasal drip noted, nasal mucosa congested, and Oropharynx clear, without erythema, exudate, or thrush.   Neck: supple and non-tender without mass, no thyromegaly or thyroid nodules, no cervical lymphadenopathy  Pulmonary/Chest: clear to auscultation bilaterally- no wheezes, rales or rhonchi, normal air movement, no respiratory distress or retractions  Cardiovascular: S1 and S2 auscultated w/ RRR. No murmurs, rubs, clicks, or gallops, distal pulses intact. Abdomen: soft, non-tender, non-distended, bowel sounds physiologic,  no rebound or guarding, no masses or hernias noted. Liver and spleen without enlargement. Extremities: no cyanosis, clubbing or edema of the lower extremities. Skin: warm and dry, no rash or erythema      Office Visit on 03/08/2023   Component Date Value Ref Range Status    SARS-COV-2, RdRp gene 03/08/2023 Negative  Negative Final    Lot Number 03/08/2023 0   Final    QC Pass/Fail 03/08/2023 0   Final    Influenza A Ab 03/08/2023 negative   Final    Influenza B Ab 03/08/2023 negative   Final       ASSESSMENT & PLAN  1. Acute rhinosinusitis    VSS  Exam reassuring  Covid/flu neg  Augmentin  Tessalon  F/u if no better  Reviewed ER precautions, pt understands. - POCT COVID-19 Rapid, NAAT  - POCT Influenza A/B  - amoxicillin-clavulanate (AUGMENTIN) 875-125 MG per tablet; Take 1 tablet by mouth 2 times daily for 10 days  Dispense: 20 tablet; Refill: 0  - benzonatate (TESSALON PERLES) 100 MG capsule; Take 1 to 2 tablets by mouth every 8 hours as needed for cough. Dispense: 60 capsule; Refill: 0    2. Mild intermittent asthma without complication    Stable  Con't prn alb    3. Major depressive disorder with single episode, in full remission ([address]    In remission yet  Off lexapro  Con't to monitor    4. Anxiety      5. Obesity (BMI 30-39. 9)    Discussed wt loss strategies    6. Hypertension, essential    In remission  Off lisinopril  Will monitor labs  Due for f/u microalbumin, was normal prior  Has seen nephro previous  Con't to monitor BP's    - CBC with Auto Differential; Future  - Comprehensive Metabolic Panel; Future  - Lipid Panel; Future  - TSH with Reflex; Future  - Microalbumin / Creatinine Urine Ratio; Future    7.  Microalbuminuria    - CBC with Auto Differential; Future  - Comprehensive Metabolic Panel; Future  - Lipid Panel; Future  - TSH with Reflex; Future  - Microalbumin / Creatinine Urine Ratio; Future      DISPOSITION    Return if symptoms worsen or fail to improve. Kaylan Loredo released without restrictions. PATIENT COUNSELING    Barriers to learning and self management: none    Discussed use, benefit, and side effects of prescribed medications. Barriers to medication compliance addressed. All patient questions answered. Pt voiced understanding.        Electronically signed by Erick Perez DO on 3/8/2023 at 2:09 PM

## 2023-03-08 NOTE — PATIENT INSTRUCTIONS
LAB INSTRUCTIONS:    Please complete labs within 4 week(s).    Please fast for 8 hours prior to lab collection.    The clinic will call you within 1 week of collection. If you have not heard from us within that amount of time, please call us at [phone].

## 2023-03-28 ENCOUNTER — PATIENT MESSAGE (OUTPATIENT)
Dept: FAMILY MEDICINE CLINIC | Age: 28
End: 2023-03-28

## 2023-03-28 DIAGNOSIS — J30.9 CHRONIC ALLERGIC RHINITIS: Primary | ICD-10-CM

## 2023-03-28 DIAGNOSIS — J30.1 CHRONIC SEASONAL ALLERGIC RHINITIS DUE TO POLLEN: Chronic | ICD-10-CM

## 2023-03-29 RX ORDER — FLUTICASONE PROPIONATE 50 MCG
2 SPRAY, SUSPENSION (ML) NASAL DAILY
Qty: 3 EACH | Refills: 3 | Status: SHIPPED | OUTPATIENT
Start: 2023-03-29 | End: 2023-03-30

## 2023-03-29 NOTE — TELEPHONE ENCOUNTER
From: Maci Enriquez  To: Dr. Trupti Lopez: 3/28/2023 10:57 PM EDT  Subject: Allergies    Im having a bout again with allergies. My head hurts when I wake up in the morning and sinuses are draining to my chest. My voice is hoarse and Im coughing a lot often time spitting up mucus. My ears have had some pain with the draining. Could I get a referral to an allergist? Im taking the pearls you gave me from over a week ago along with allergy medication such as NyQuil and mucinex. I also need a refill on my Flonase.

## 2023-03-30 ENCOUNTER — HOSPITAL ENCOUNTER (EMERGENCY)
Age: 28
Discharge: HOME | End: 2023-03-30
Payer: COMMERCIAL

## 2023-03-30 VITALS
TEMPERATURE: 97.6 F | DIASTOLIC BLOOD PRESSURE: 90 MMHG | HEIGHT: 66 IN | SYSTOLIC BLOOD PRESSURE: 136 MMHG | RESPIRATION RATE: 18 BRPM | OXYGEN SATURATION: 99 % | WEIGHT: 260 LBS | HEART RATE: 85 BPM | BODY MASS INDEX: 41.78 KG/M2

## 2023-03-30 DIAGNOSIS — J30.1 SEASONAL ALLERGIC RHINITIS DUE TO POLLEN: Primary | ICD-10-CM

## 2023-03-30 PROCEDURE — 99213 OFFICE O/P EST LOW 20 MIN: CPT

## 2023-03-30 PROCEDURE — 99213 OFFICE O/P EST LOW 20 MIN: CPT | Performed by: NURSE PRACTITIONER

## 2023-03-30 RX ORDER — AZELASTINE 1 MG/ML
2 SPRAY, METERED NASAL 2 TIMES DAILY
Qty: 30 ML | Refills: 0 | Status: SHIPPED | OUTPATIENT
Start: 2023-03-30

## 2023-03-30 RX ORDER — CETIRIZINE HYDROCHLORIDE, PSEUDOEPHEDRINE HYDROCHLORIDE 5; 120 MG/1; MG/1
1 TABLET, FILM COATED, EXTENDED RELEASE ORAL 2 TIMES DAILY
Qty: 60 TABLET | Refills: 0 | Status: SHIPPED | OUTPATIENT
Start: 2023-03-30 | End: 2023-04-29

## 2023-03-30 ASSESSMENT — ENCOUNTER SYMPTOMS
DIARRHEA: 0
EYE REDNESS: 0
EYE DISCHARGE: 0
SHORTNESS OF BREATH: 0
SINUS PRESSURE: 1
VOMITING: 0
SORE THROAT: 0
NAUSEA: 0
COUGH: 1
RHINORRHEA: 1
TROUBLE SWALLOWING: 0

## 2023-03-30 ASSESSMENT — PAIN - FUNCTIONAL ASSESSMENT: PAIN_FUNCTIONAL_ASSESSMENT: NONE - DENIES PAIN

## 2023-03-30 NOTE — DISCHARGE INSTRUCTIONS
Take all medications as prescribed. Treat the symptoms by making sure you are drinking fluids and you are well-rested. You may take Tylenol or Motrin per package instructions, unless otherwise directed. Seek emergency medical treatment for fever >101.5 for 3 days, unable to eat or urinate for 6 hours, increase in current symptoms or for new or worrisome symptoms.

## 2023-03-30 NOTE — Clinical Note
Winter Salcido was seen and treated in our emergency department on 3/30/2023. She may return to work on 04/01/2023. If you have any questions or concerns, please don't hesitate to call.       Gabe Bates, WILLY - CNP

## 2023-03-30 NOTE — ED TRIAGE NOTES
To room with c/o nasal congestion, headache, ear pain intermittently, and facial pain that started Monday. Using OTC Coricidin HBP and Nyquil without relief.

## 2023-03-30 NOTE — ED PROVIDER NOTES
[facility]  Urgent Care Encounter      CHIEF COMPLAINT       Chief Complaint   Patient presents with    Nasal Congestion    Facial Pain    Headache       Nurses Notes reviewed and I agree except as noted in the HPI. HISTORY OF PRESENT ILLNESS   Baldemar Navarro is a 24 y.o. female who presents for 4-day history of rhinorrhea, congestion, sinus pressure, cough, and headaches. Patient was treated 2 weeks ago for sinus infection with amoxicillin. Patient is also using Flonase nasal spray and Allegra. She states her nose has continuously ran for the last 24 hours. She denies fever, nausea, vomiting, diarrhea, chest pain, shortness of breath, or other unusual symptoms at this time. She denies chance of pregnancy at this time. REVIEW OF SYSTEMS     Review of Systems   Constitutional:  Negative for chills, diaphoresis, fatigue and fever. HENT:  Positive for congestion, rhinorrhea and sinus pressure. Negative for ear pain, sore throat and trouble swallowing. Eyes:  Negative for discharge and redness. Respiratory:  Positive for cough. Negative for shortness of breath. Cardiovascular:  Negative for chest pain. Gastrointestinal:  Negative for diarrhea, nausea and vomiting. Genitourinary:  Negative for decreased urine volume. Musculoskeletal:  Negative for neck pain and neck stiffness. Skin:  Negative for rash. Neurological:  Positive for headaches. Hematological:  Negative for adenopathy. Psychiatric/Behavioral:  Negative for sleep disturbance. PAST MEDICAL HISTORY         Diagnosis Date    Allergic rhinitis     Asthma, mild intermittent     History of pneumonia     Hypertension, essential     Irritable bowel syndrome with diarrhea     Major depression     Obesity (BMI 30-39. 9)     PCOS (polycystic ovarian syndrome)        SURGICAL HISTORY     Patient  has a past surgical history that includes Tonsillectomy and [place] tooth extraction.     CURRENT MEDICATIONS Normal range of motion and neck supple. Lymphadenopathy:      Cervical: No cervical adenopathy. Skin:     General: Skin is warm and dry. Capillary Refill: Capillary refill takes less than 2 seconds. Neurological:      General: No focal deficit present. Mental Status: She is alert and oriented to person, place, and time. Psychiatric:         Mood and Affect: Mood normal.         Behavior: Behavior normal.         Thought Content: Thought content normal.         Judgment: Judgment normal.       DIAGNOSTIC RESULTS   Labs:No results found for this visit on 03/30/23. IMAGING:  No orders to display      URGENT CARE COURSE:     Vitals:    03/30/23 1730   BP: (!) 136/90   Pulse: 85   Resp: 18   Temp: 97.6 °F (36.4 °C)   TempSrc: Temporal   SpO2: 99%   Weight: 260 lb (117.9 kg)   Height: 5' 6\" (1.676 m)       Medications - No data to display  PROCEDURES:  None  FINAL IMPRESSION      1. Seasonal allergic rhinitis due to pollen        DISPOSITION/PLAN   DISPOSITION Decision To Discharge 03/30/2023 05:59:31 PM    We will switch patient to azelastine nasal spray and Zyrtec-D. Patient has allergy specialist that she follows up with. Discharged home in good condition.   PATIENT REFERRED TO:  Gena Casillas. [address]  [phone]    In 1 week  If symptoms worsen  DISCHARGE MEDICATIONS:  New Prescriptions    AZELASTINE (ASTELIN) 0.1 % NASAL SPRAY    2 sprays by Nasal route 2 times daily Use in each nostril as directed    CETIRIZINE-PSUEDOEPHEDRINE (ZYRTEC-D) 5-120 MG PER EXTENDED RELEASE TABLET    Take 1 tablet by mouth 2 times daily     Current Discharge Medication List          Alfonzo Montes De Oca, [facility], APRN - CNP  03/30/23 8147

## 2023-04-03 ENCOUNTER — TELEPHONE (OUTPATIENT)
Dept: FAMILY MEDICINE CLINIC | Age: 28
End: 2023-04-03

## 2023-04-03 NOTE — TELEPHONE ENCOUNTER
For her, no. She is low risk for progression to severe disease and she's already a week into her symptoms  The anti-virals for COVID we can use within the 1st 5 days of symptoms  I would just have her treat herself symptomatically and f/u any changes  Let me know if questions, thanks!

## 2023-04-03 NOTE — TELEPHONE ENCOUNTER
Pt tested positive for covid today  She wanted to know if she needed to take anything     Sxs started 3/27/23  Headache  Cough  Ears popping  Nasal congestion

## 2024-02-23 ENCOUNTER — APPOINTMENT (OUTPATIENT)
Dept: GENERAL RADIOLOGY | Age: 29
End: 2024-02-23
Payer: COMMERCIAL

## 2024-02-23 ENCOUNTER — HOSPITAL ENCOUNTER (EMERGENCY)
Age: 29
Discharge: HOME | End: 2024-02-23
Attending: STUDENT IN AN ORGANIZED HEALTH CARE EDUCATION/TRAINING PROGRAM
Payer: COMMERCIAL

## 2024-02-23 VITALS
BODY MASS INDEX: 43.39 KG/M2 | WEIGHT: 270 LBS | HEIGHT: 66 IN | DIASTOLIC BLOOD PRESSURE: 79 MMHG | TEMPERATURE: 97.9 F | SYSTOLIC BLOOD PRESSURE: 131 MMHG | OXYGEN SATURATION: 97 % | RESPIRATION RATE: 16 BRPM | HEART RATE: 74 BPM

## 2024-02-23 DIAGNOSIS — F41.1 ANXIETY STATE: ICD-10-CM

## 2024-02-23 DIAGNOSIS — F32.1 CURRENT MODERATE EPISODE OF MAJOR DEPRESSIVE DISORDER WITHOUT PRIOR EPISODE (HCC): Chronic | ICD-10-CM

## 2024-02-23 DIAGNOSIS — R00.2 PALPITATIONS: Primary | ICD-10-CM

## 2024-02-23 DIAGNOSIS — F41.9 ANXIETY: ICD-10-CM

## 2024-02-23 LAB
ALBUMIN SERPL BCG-MCNC: 4.3 G/DL (ref 3.5–5.1)
ALP SERPL-CCNC: 93 U/L (ref 38–126)
ALT SERPL W/O P-5'-P-CCNC: 21 U/L (ref 11–66)
ANION GAP SERPL CALC-SCNC: 13 MEQ/L (ref 8–16)
AST SERPL-CCNC: 23 U/L (ref 5–40)
B-HCG SERPL QL: NEGATIVE
BASOPHILS ABSOLUTE: 0 THOU/MM3 (ref 0–0.1)
BASOPHILS NFR BLD AUTO: 0.5 %
BILIRUB SERPL-MCNC: 0.4 MG/DL (ref 0.3–1.2)
BUN SERPL-MCNC: 15 MG/DL (ref 7–22)
CALCIUM SERPL-MCNC: 9.6 MG/DL (ref 8.5–10.5)
CHLORIDE SERPL-SCNC: 101 MEQ/L (ref 98–111)
CO2 SERPL-SCNC: 26 MEQ/L (ref 23–33)
CREAT SERPL-MCNC: 1 MG/DL (ref 0.4–1.2)
DEPRECATED RDW RBC AUTO: 38.9 FL (ref 35–45)
EKG ATRIAL RATE: 83 BPM
EKG P AXIS: 50 DEGREES
EKG P-R INTERVAL: 146 MS
EKG Q-T INTERVAL: 360 MS
EKG QRS DURATION: 86 MS
EKG QTC CALCULATION (BAZETT): 423 MS
EKG R AXIS: 41 DEGREES
EKG T AXIS: 35 DEGREES
EKG VENTRICULAR RATE: 83 BPM
EOSINOPHIL NFR BLD AUTO: 1 %
EOSINOPHILS ABSOLUTE: 0.1 THOU/MM3 (ref 0–0.4)
ERYTHROCYTE [DISTWIDTH] IN BLOOD BY AUTOMATED COUNT: 12.5 % (ref 11.5–14.5)
GFR SERPL CREATININE-BSD FRML MDRD: > 60 ML/MIN/1.73M2
GLUCOSE SERPL-MCNC: 88 MG/DL (ref 70–108)
HCT VFR BLD AUTO: 43.1 % (ref 37–47)
HGB BLD-MCNC: 14.9 GM/DL (ref 12–16)
IMM GRANULOCYTES # BLD AUTO: 0.02 THOU/MM3 (ref 0–0.07)
IMM GRANULOCYTES NFR BLD AUTO: 0.3 %
LYMPHOCYTES ABSOLUTE: 2.2 THOU/MM3 (ref 1–4.8)
LYMPHOCYTES NFR BLD AUTO: 28.3 %
MAGNESIUM SERPL-MCNC: 2.2 MG/DL (ref 1.6–2.4)
MCH RBC QN AUTO: 30.1 PG (ref 26–33)
MCHC RBC AUTO-ENTMCNC: 34.6 GM/DL (ref 32.2–35.5)
MCV RBC AUTO: 87.1 FL (ref 81–99)
MONOCYTES ABSOLUTE: 0.6 THOU/MM3 (ref 0.4–1.3)
MONOCYTES NFR BLD AUTO: 7.2 %
NEUTROPHILS NFR BLD AUTO: 62.7 %
NRBC BLD AUTO-RTO: 0 /100 WBC
OSMOLALITY SERPL CALC.SUM OF ELEC: 279.6 MOSMOL/KG (ref 275–300)
PLATELET # BLD AUTO: 242 THOU/MM3 (ref 130–400)
PMV BLD AUTO: 9.2 FL (ref 9.4–12.4)
POTASSIUM SERPL-SCNC: 4.6 MEQ/L (ref 3.5–5.2)
PROT SERPL-MCNC: 8.2 G/DL (ref 6.1–8)
RBC # BLD AUTO: 4.95 MILL/MM3 (ref 4.2–5.4)
SEGMENTED NEUTROPHILS ABSOLUTE COUNT: 4.8 THOU/MM3 (ref 1.8–7.7)
SODIUM SERPL-SCNC: 140 MEQ/L (ref 135–145)
TROPONIN, HIGH SENSITIVITY: < 6 NG/L (ref 0–12)
WBC # BLD AUTO: 7.7 THOU/MM3 (ref 4.8–10.8)

## 2024-02-23 PROCEDURE — 6370000000 HC RX 637 (ALT 250 FOR IP)

## 2024-02-23 PROCEDURE — 36415 COLL VENOUS BLD VENIPUNCTURE: CPT

## 2024-02-23 PROCEDURE — 85025 COMPLETE CBC W/AUTO DIFF WBC: CPT

## 2024-02-23 PROCEDURE — 93005 ELECTROCARDIOGRAM TRACING: CPT

## 2024-02-23 PROCEDURE — 83735 ASSAY OF MAGNESIUM: CPT

## 2024-02-23 PROCEDURE — 99285 EMERGENCY DEPT VISIT HI MDM: CPT

## 2024-02-23 PROCEDURE — 71045 X-RAY EXAM CHEST 1 VIEW: CPT

## 2024-02-23 PROCEDURE — 93010 ELECTROCARDIOGRAM REPORT: CPT | Performed by: INTERNAL MEDICINE

## 2024-02-23 PROCEDURE — 84484 ASSAY OF TROPONIN QUANT: CPT

## 2024-02-23 PROCEDURE — 80053 COMPREHEN METABOLIC PANEL: CPT

## 2024-02-23 PROCEDURE — 84703 CHORIONIC GONADOTROPIN ASSAY: CPT

## 2024-02-23 RX ORDER — HYDROXYZINE HYDROCHLORIDE 25 MG/1
25 TABLET, FILM COATED ORAL EVERY 8 HOURS PRN
Qty: 90 TABLET | Refills: 0 | Status: SHIPPED | OUTPATIENT
Start: 2024-02-23

## 2024-02-23 RX ORDER — HYDROXYZINE HYDROCHLORIDE 25 MG/1
25 TABLET, FILM COATED ORAL ONCE
Status: COMPLETED | OUTPATIENT
Start: 2024-02-23 | End: 2024-02-23

## 2024-02-23 RX ADMIN — HYDROXYZINE HYDROCHLORIDE 25 MG: 25 TABLET, FILM COATED ORAL at 18:16

## 2024-02-23 ASSESSMENT — PAIN - FUNCTIONAL ASSESSMENT: PAIN_FUNCTIONAL_ASSESSMENT: NONE - DENIES PAIN

## 2024-02-23 NOTE — ED PROVIDER NOTES
[facility] EMERGENCY DEPARTMENT  EMERGENCY DEPARTMENT ENCOUNTER          Pt Name: Tamara Nuñez  MRN: [id number]  Birthdate 1995  Date of evaluation: 2/23/2024  Resident Physician: Alfa Gandara MD EM Resident PGY-2  Attending Physician: Elmer Bolton DO      CHIEF COMPLAINT       Chief Complaint   Patient presents with    Palpitations         HISTORY OF PRESENT ILLNESS    HPI  Tamara Nuñez is a 28 y.o. female who presents to the emergency department from home, as a walk in to the ED lobby for evaluation of palpitations.  Patient states that she feels like her heart is fluttering for the past 2 to 3 days.  She denies excess caffeine usage.  She believes that she has increased stress from work and has a history of anxiety.  Patient states that she was on anxiety medication in the past however recently changed insurance and has not been able to refill her medications.  She denies any chest pain, shortness of breath, nausea, vomiting, diarrhea.      The patient has no other acute complaints at this time.    ROS negative except as stated above.    PAST MEDICAL AND SURGICAL HISTORY     Past Medical History:   Diagnosis Date    Allergic rhinitis     Asthma, mild intermittent     History of pneumonia     Hypertension, essential     Irritable bowel syndrome with diarrhea     Major depression     Obesity (BMI 30-39.9)     PCOS (polycystic ovarian syndrome)      Past Surgical History:   Procedure Laterality Date    TONSILLECTOMY      adnoids    WISDOM TOOTH EXTRACTION           MEDICATIONS   No current facility-administered medications for this encounter.    Current Outpatient Medications:     hydrOXYzine HCl (ATARAX) 25 MG tablet, Take 1 tablet by mouth every 8 hours as needed for Anxiety, Disp: 90 tablet, Rfl: 0    azelastine (ASTELIN) 0.1 % nasal spray, 2 sprays by Nasal route 2 times daily Use in each nostril as directed, Disp: 30 mL, Rfl: 0    albuterol sulfate HFA (VENTOLIN HFA) 108 (90 Base)

## 2024-02-23 NOTE — ED TRIAGE NOTES
Patient presents to ED from home with complaints of palpitations. Patient states feel like heart is fluttering for the past 2-3 days. Patient denies excess caffeine usage, states hx of anxiety. Patient believes this is secondary to stress at work and home, states has been prescribed medication for anxiety in the past but got new insurance and needs to find a new doctor. Patient denies SI and HI at this time. Patient denies any chest pain or shortness of breath, no nausea, vomiting, or diarrhea at this time. Patient is alert and oriented, ambulatory, and VSS at this time.

## 2024-02-24 NOTE — DISCHARGE INSTRUCTIONS
You were seen and evaluated emergency department today for palpitations, anxiety.  Your chest x-ray EKG and lab work were largely reassuring without any emergent findings.  You were given Atarax which helped with your anxiety.  Please follow-up with your primary care provider for further evaluation of palpitations, anxiety, depression, and questions for PCOS.  Return to emergency department anytime for acute or worrisome changes including significant chest pain, shortness of breath, loss of consciousness or any other worrisome changes.

## 2024-02-24 NOTE — ED NOTES
Discharge instructions and new medications discussed and explained with Pt. Pt. Verbalized understanding and has no further questions or needs at this time.

## 2024-09-08 NOTE — TELEPHONE ENCOUNTER
----- Message from Loreda Heimlich, DO sent at 10/7/2020  6:48 PM EDT -----  Please let pt know that f/u thyroid labs are WNL. No f/u needed for this. Let me know if questions, thanks! Statement Selected

## 2025-02-10 ENCOUNTER — TELEPHONE (OUTPATIENT)
Dept: FAMILY MEDICINE CLINIC | Age: 30
End: 2025-02-10

## 2025-02-10 SDOH — ECONOMIC STABILITY: FOOD INSECURITY: WITHIN THE PAST 12 MONTHS, THE FOOD YOU BOUGHT JUST DIDN'T LAST AND YOU DIDN'T HAVE MONEY TO GET MORE.: PATIENT DECLINED

## 2025-02-10 SDOH — ECONOMIC STABILITY: FOOD INSECURITY: WITHIN THE PAST 12 MONTHS, YOU WORRIED THAT YOUR FOOD WOULD RUN OUT BEFORE YOU GOT MONEY TO BUY MORE.: PATIENT DECLINED

## 2025-02-10 SDOH — ECONOMIC STABILITY: INCOME INSECURITY: IN THE LAST 12 MONTHS, WAS THERE A TIME WHEN YOU WERE NOT ABLE TO PAY THE MORTGAGE OR RENT ON TIME?: NO

## 2025-02-10 SDOH — ECONOMIC STABILITY: TRANSPORTATION INSECURITY
IN THE PAST 12 MONTHS, HAS THE LACK OF TRANSPORTATION KEPT YOU FROM MEDICAL APPOINTMENTS OR FROM GETTING MEDICATIONS?: PATIENT DECLINED

## 2025-02-10 SDOH — ECONOMIC STABILITY: TRANSPORTATION INSECURITY
IN THE PAST 12 MONTHS, HAS LACK OF TRANSPORTATION KEPT YOU FROM MEETINGS, WORK, OR FROM GETTING THINGS NEEDED FOR DAILY LIVING?: NO

## 2025-02-10 ASSESSMENT — PATIENT HEALTH QUESTIONNAIRE - PHQ9
9. THOUGHTS THAT YOU WOULD BE BETTER OFF DEAD, OR OF HURTING YOURSELF: NOT AT ALL
6. FEELING BAD ABOUT YOURSELF - OR THAT YOU ARE A FAILURE OR HAVE LET YOURSELF OR YOUR FAMILY DOWN: MORE THAN HALF THE DAYS
SUM OF ALL RESPONSES TO PHQ QUESTIONS 1-9: 12
1. LITTLE INTEREST OR PLEASURE IN DOING THINGS: SEVERAL DAYS
SUM OF ALL RESPONSES TO PHQ9 QUESTIONS 1 & 2: 2
SUM OF ALL RESPONSES TO PHQ QUESTIONS 1-9: 12
7. TROUBLE CONCENTRATING ON THINGS, SUCH AS READING THE NEWSPAPER OR WATCHING TELEVISION: NEARLY EVERY DAY
10. IF YOU CHECKED OFF ANY PROBLEMS, HOW DIFFICULT HAVE THESE PROBLEMS MADE IT FOR YOU TO DO YOUR WORK, TAKE CARE OF THINGS AT HOME, OR GET ALONG WITH OTHER PEOPLE: NOT DIFFICULT AT ALL
10. IF YOU CHECKED OFF ANY PROBLEMS, HOW DIFFICULT HAVE THESE PROBLEMS MADE IT FOR YOU TO DO YOUR WORK, TAKE CARE OF THINGS AT HOME, OR GET ALONG WITH OTHER PEOPLE: NOT DIFFICULT AT ALL
6. FEELING BAD ABOUT YOURSELF - OR THAT YOU ARE A FAILURE OR HAVE LET YOURSELF OR YOUR FAMILY DOWN: MORE THAN HALF THE DAYS
SUM OF ALL RESPONSES TO PHQ QUESTIONS 1-9: 12
4. FEELING TIRED OR HAVING LITTLE ENERGY: SEVERAL DAYS
1. LITTLE INTEREST OR PLEASURE IN DOING THINGS: SEVERAL DAYS
3. TROUBLE FALLING OR STAYING ASLEEP: NEARLY EVERY DAY
5. POOR APPETITE OR OVEREATING: SEVERAL DAYS
2. FEELING DOWN, DEPRESSED OR HOPELESS: SEVERAL DAYS
4. FEELING TIRED OR HAVING LITTLE ENERGY: SEVERAL DAYS
8. MOVING OR SPEAKING SO SLOWLY THAT OTHER PEOPLE COULD HAVE NOTICED. OR THE OPPOSITE - BEING SO FIDGETY OR RESTLESS THAT YOU HAVE BEEN MOVING AROUND A LOT MORE THAN USUAL: NOT AT ALL
SUM OF ALL RESPONSES TO PHQ QUESTIONS 1-9: 12
8. MOVING OR SPEAKING SO SLOWLY THAT OTHER PEOPLE COULD HAVE NOTICED. OR THE OPPOSITE, BEING SO FIGETY OR RESTLESS THAT YOU HAVE BEEN MOVING AROUND A LOT MORE THAN USUAL: NOT AT ALL
SUM OF ALL RESPONSES TO PHQ QUESTIONS 1-9: 12
3. TROUBLE FALLING OR STAYING ASLEEP: NEARLY EVERY DAY
2. FEELING DOWN, DEPRESSED OR HOPELESS: SEVERAL DAYS
9. THOUGHTS THAT YOU WOULD BE BETTER OFF DEAD, OR OF HURTING YOURSELF: NOT AT ALL
5. POOR APPETITE OR OVEREATING: SEVERAL DAYS
7. TROUBLE CONCENTRATING ON THINGS, SUCH AS READING THE NEWSPAPER OR WATCHING TELEVISION: NEARLY EVERY DAY

## 2025-02-10 NOTE — PATIENT INSTRUCTIONS
LAB INSTRUCTIONS:    Please complete labs within 2 week(s).    Please fast for 8 hours prior to lab collection.    The clinic will call you within 1 week of collection. If you have not heard from us within that amount of time, please call us at [phone].

## 2025-02-10 NOTE — PROGRESS NOTES
PCOS (polycystic ovarian syndrome)        Past Surgical History:   Procedure Laterality Date    TONSILLECTOMY      adnoids    WISDOM TOOTH EXTRACTION         Allergies   Allergen Reactions    Seasonal Headaches         Social History     Tobacco Use    Smoking status: Never    Smokeless tobacco: Never   Substance Use Topics    Alcohol use: No       Family History   Problem Relation Age of Onset    Diabetes Mother     High Blood Pressure Mother     High Blood Pressure Father     High Cholesterol Father     Colon Cancer Neg Hx     Breast Cancer Neg Hx        I have reviewed the patient's past medical history, past surgical history, allergies, medications, social and family history and I have made updates where appropriate.      Review of Systems  Positive responses are highlighted in bold    Constitutional:  Fever, Chills, Night Sweats, Fatigue, Unexpected changes in weight  HENT:  Ear pain, Tinnitus, Nosebleeds, Trouble swallowing, Hearing loss, Sore throat  Cardiovascular:  Chest Pain, Palpitations, Orthopnea, Paroxysmal Nocturnal Dyspnea  Respiratory:  Cough, Wheezing, Shortness of breath, Chest tightness, Apnea  Gastrointestinal:  Nausea, Vomiting, Diarrhea, Constipation, Heartburn, Blood in stool  Genitourinary:  Difficulty or painful urination, Flank pain, Change in frequency, Urgency  Skin:  Color change, Rash, Itching, Wound  Musculoskeletal:  Joint pain, Back pain, Gait problems, Joint swelling, Myalgias  Neurological:  Dizziness, Headaches, Presyncope, Numbness, Seizures, Tremors  Endocrine:  Heat Intolerance, Cold Intolerance, Polydipsia, Polyphagia, Polyuria      PHYSICAL EXAM:  Vitals:    02/11/25 1029   BP: 130/78   Pulse: 83   Resp: 16   Temp: 97.7 °F (36.5 °C)   SpO2: 99%   Weight: 120.9 kg (266 lb 9.6 oz)   Height: 1.676 m (5' 5.98\")     Body mass index is 43.05 kg/m².     VS Reviewed  General Appearance: A&O x 3, No acute distress,well developed and well- nourished  Eyes: pupils equal, round, and

## 2025-02-10 NOTE — TELEPHONE ENCOUNTER
Pt came in stating that she woke up with chest pain today.  \" I woke up and had a sharp pain on the left side of my chest and my arm tingled a little bit. Then the pain moved to the right side of my chest. I had chili last night so I'm not sure if its indigestion or if it is my anxiety.\"    Asked pt if she has a history of anxiety and she stated she does and used to take hydroxyzine for it but it makes her very sleepy and she has not taken any since October or November.     Pt stated that the chest pain has been going on for about a month or so. It is off and on.   Informed Dr. Rocha and he stated that as long as she is not having active chest pain we can put her on the schedule for tomorrow. IF she is having chest pain she needs to go to the ER to be evaluated.    Informed patient and she denied having any chest pain a this time, just anxious. Also told her that if she develops chest pain again and/or gets short of breath or her chest gets heavy to go to ER to be evaluated.   Pt mentioned that she was worried about going to ER due to the out of pocket cost. Informed her that there is financial assistance that we offer patients.     Pt had mentioned to Cassidy after I walked away that she is also anxious because her cousin passed away from a heart attack at age 27.     Future Appointments   Date Time Provider Department Center   2/11/2025 10:20 AM Carlos Rocha, DO [facility]

## 2025-02-11 ENCOUNTER — OFFICE VISIT (OUTPATIENT)
Dept: FAMILY MEDICINE CLINIC | Age: 30
End: 2025-02-11
Payer: COMMERCIAL

## 2025-02-11 VITALS
SYSTOLIC BLOOD PRESSURE: 130 MMHG | OXYGEN SATURATION: 99 % | HEART RATE: 83 BPM | RESPIRATION RATE: 16 BRPM | WEIGHT: 266.6 LBS | DIASTOLIC BLOOD PRESSURE: 78 MMHG | TEMPERATURE: 97.7 F | BODY MASS INDEX: 42.85 KG/M2 | HEIGHT: 66 IN

## 2025-02-11 DIAGNOSIS — I10 HYPERTENSION, ESSENTIAL: Chronic | ICD-10-CM

## 2025-02-11 DIAGNOSIS — E66.9 OBESITY (BMI 30-39.9): Chronic | ICD-10-CM

## 2025-02-11 DIAGNOSIS — F41.8 ANXIETY WITH DEPRESSION: Primary | ICD-10-CM

## 2025-02-11 DIAGNOSIS — J45.20 MILD INTERMITTENT ASTHMA WITHOUT COMPLICATION: ICD-10-CM

## 2025-02-11 DIAGNOSIS — R80.9 MICROALBUMINURIA: ICD-10-CM

## 2025-02-11 PROBLEM — F41.9 ANXIETY: Status: ACTIVE | Noted: 2025-02-11

## 2025-02-11 PROBLEM — F41.9 ANXIETY: Status: RESOLVED | Noted: 2025-02-11 | Resolved: 2025-02-11

## 2025-02-11 PROCEDURE — 3075F SYST BP GE 130 - 139MM HG: CPT | Performed by: FAMILY MEDICINE

## 2025-02-11 PROCEDURE — 3078F DIAST BP <80 MM HG: CPT | Performed by: FAMILY MEDICINE

## 2025-02-11 PROCEDURE — 99214 OFFICE O/P EST MOD 30 MIN: CPT | Performed by: FAMILY MEDICINE

## 2025-02-11 RX ORDER — HYDROXYZINE HYDROCHLORIDE 10 MG/1
10 TABLET, FILM COATED ORAL EVERY 8 HOURS PRN
Qty: 90 TABLET | Refills: 0 | Status: SHIPPED | OUTPATIENT
Start: 2025-02-11

## 2025-02-11 RX ORDER — CITALOPRAM HYDROBROMIDE 20 MG/1
20 TABLET ORAL DAILY
Qty: 30 TABLET | Refills: 3 | Status: SHIPPED | OUTPATIENT
Start: 2025-02-11

## 2025-02-18 ENCOUNTER — LAB (OUTPATIENT)
Dept: LAB | Age: 30
End: 2025-02-18

## 2025-02-18 DIAGNOSIS — R80.9 MICROALBUMINURIA: ICD-10-CM

## 2025-02-18 DIAGNOSIS — I10 HYPERTENSION, ESSENTIAL: Chronic | ICD-10-CM

## 2025-02-18 LAB
ALBUMIN SERPL BCG-MCNC: 4.9 G/DL (ref 3.5–5.1)
ALP SERPL-CCNC: 95 U/L (ref 38–126)
ALT SERPL W/O P-5'-P-CCNC: 14 U/L (ref 11–66)
ANION GAP SERPL CALC-SCNC: 17 MEQ/L (ref 8–16)
AST SERPL-CCNC: 18 U/L (ref 5–40)
BASOPHILS ABSOLUTE: 0 THOU/MM3 (ref 0–0.1)
BASOPHILS NFR BLD AUTO: 0.6 %
BILIRUB SERPL-MCNC: 0.5 MG/DL (ref 0.3–1.2)
BUN SERPL-MCNC: 12 MG/DL (ref 7–22)
CALCIUM SERPL-MCNC: 9.9 MG/DL (ref 8.2–9.6)
CHLORIDE SERPL-SCNC: 96 MEQ/L (ref 98–111)
CHOLEST SERPL-MCNC: 253 MG/DL (ref 100–199)
CO2 SERPL-SCNC: 25 MEQ/L (ref 23–33)
CREAT SERPL-MCNC: 0.9 MG/DL (ref 0.4–1.2)
CREAT UR-MCNC: 136 MG/DL
DEPRECATED RDW RBC AUTO: 40.8 FL (ref 35–45)
EOSINOPHIL NFR BLD AUTO: 0.4 %
EOSINOPHILS ABSOLUTE: 0 THOU/MM3 (ref 0–0.4)
ERYTHROCYTE [DISTWIDTH] IN BLOOD BY AUTOMATED COUNT: 12.7 % (ref 11.5–14.5)
GFR SERPL CREATININE-BSD FRML MDRD: 89 ML/MIN/1.73M2
GLUCOSE SERPL-MCNC: 87 MG/DL (ref 70–108)
HCT VFR BLD AUTO: 51 % (ref 37–47)
HDLC SERPL-MCNC: 63 MG/DL
HGB BLD-MCNC: 17.1 GM/DL (ref 12–16)
IMM GRANULOCYTES # BLD AUTO: 0.03 THOU/MM3 (ref 0–0.07)
IMM GRANULOCYTES NFR BLD AUTO: 0.4 %
LDLC SERPL CALC-MCNC: 173 MG/DL
LYMPHOCYTES ABSOLUTE: 1.8 THOU/MM3 (ref 1–4.8)
LYMPHOCYTES NFR BLD AUTO: 27.1 %
MCH RBC QN AUTO: 29.5 PG (ref 26–33)
MCHC RBC AUTO-ENTMCNC: 33.5 GM/DL (ref 32.2–35.5)
MCV RBC AUTO: 88.1 FL (ref 81–99)
MICROALBUMIN UR-MCNC: 17.53 MG/DL
MICROALBUMIN/CREAT RATIO PNL UR: 129 MG/G (ref 0–30)
MONOCYTES ABSOLUTE: 0.5 THOU/MM3 (ref 0.4–1.3)
MONOCYTES NFR BLD AUTO: 6.9 %
NEUTROPHILS ABSOLUTE: 4.4 THOU/MM3 (ref 1.8–7.7)
NEUTROPHILS NFR BLD AUTO: 64.6 %
NRBC BLD AUTO-RTO: 0 /100 WBC
PLATELET # BLD AUTO: 284 THOU/MM3 (ref 130–400)
PMV BLD AUTO: 10 FL (ref 9.4–12.4)
POTASSIUM SERPL-SCNC: 3.9 MEQ/L (ref 3.5–5.2)
PROT SERPL-MCNC: 8.8 G/DL (ref 6.1–8)
RBC # BLD AUTO: 5.79 MILL/MM3 (ref 4.2–5.4)
SODIUM SERPL-SCNC: 138 MEQ/L (ref 135–145)
TRIGL SERPL-MCNC: 83 MG/DL (ref 0–199)
TSH SERPL DL<=0.005 MIU/L-ACNC: 1.47 UIU/ML (ref 0.4–4.2)
WBC # BLD AUTO: 6.8 THOU/MM3 (ref 4.8–10.8)

## 2025-02-19 ENCOUNTER — TELEPHONE (OUTPATIENT)
Dept: FAMILY MEDICINE CLINIC | Age: 30
End: 2025-02-19

## 2025-02-19 DIAGNOSIS — R80.9 MICROALBUMINURIA: ICD-10-CM

## 2025-02-19 DIAGNOSIS — D75.1 POLYCYTHEMIA: Primary | ICD-10-CM

## 2025-02-19 NOTE — TELEPHONE ENCOUNTER
Pt informed of lab results. Pt voiced understanding with no further questions at this time.       Is she ok to take a magnesium supplement?  The pharmacist recommended her talk to her physician about taking it. She wanted to make sure it didn't effect any of her other medication.

## 2025-02-19 NOTE — TELEPHONE ENCOUNTER
Left message on answering machine. Requested pt to call back at [phone], at their earliest convenience.

## 2025-02-19 NOTE — TELEPHONE ENCOUNTER
----- Message from Dr. Carlos Rocha, DO sent at 2/19/2025  6:59 AM EST -----  Please let pt know that labs are back, a few things to f/u on:  -Cholesterol higher than prior. Not high enough for medications, but would encourage a lower fat/cholesterol diet  -protein in urine remains present, but mild.   -Hemoglobin slightly elevated, this is likely transient as rest of CBC is ok  -rest of labs look good.     Would like to repeat CBC in 4 wks and a f/u urine in 4 wks as well, just to f/u. Non-fasting ok    Let me know if questions, thanks!

## 2025-02-19 NOTE — TELEPHONE ENCOUNTER
Left message on answering machine. Requested pt to call back at [phone], at their earliest convenience.       Labs sent in mail.

## 2025-03-18 ENCOUNTER — LAB (OUTPATIENT)
Dept: LAB | Age: 30
End: 2025-03-18

## 2025-03-18 ENCOUNTER — RESULTS FOLLOW-UP (OUTPATIENT)
Dept: FAMILY MEDICINE CLINIC | Age: 30
End: 2025-03-18

## 2025-03-18 DIAGNOSIS — F41.8 ANXIETY WITH DEPRESSION: ICD-10-CM

## 2025-03-18 DIAGNOSIS — D75.1 POLYCYTHEMIA: Primary | ICD-10-CM

## 2025-03-18 DIAGNOSIS — R80.9 MICROALBUMINURIA: ICD-10-CM

## 2025-03-18 DIAGNOSIS — D75.1 POLYCYTHEMIA: ICD-10-CM

## 2025-03-18 LAB
BASOPHILS ABSOLUTE: 0 THOU/MM3 (ref 0–0.1)
BASOPHILS NFR BLD AUTO: 0.6 %
CREAT UR-MCNC: 34.1 MG/DL
CREAT UR-MCNC: 34.1 MG/DL
DEPRECATED RDW RBC AUTO: 40.7 FL (ref 35–45)
EOSINOPHIL NFR BLD AUTO: 0.6 %
EOSINOPHILS ABSOLUTE: 0 THOU/MM3 (ref 0–0.4)
ERYTHROCYTE [DISTWIDTH] IN BLOOD BY AUTOMATED COUNT: 12.5 % (ref 11.5–14.5)
HCT VFR BLD AUTO: 49.6 % (ref 37–47)
HGB BLD-MCNC: 16.5 GM/DL (ref 12–16)
IMM GRANULOCYTES # BLD AUTO: 0.03 THOU/MM3 (ref 0–0.07)
IMM GRANULOCYTES NFR BLD AUTO: 0.4 %
LYMPHOCYTES ABSOLUTE: 1.7 THOU/MM3 (ref 1–4.8)
LYMPHOCYTES NFR BLD AUTO: 25.4 %
MCH RBC QN AUTO: 29.5 PG (ref 26–33)
MCHC RBC AUTO-ENTMCNC: 33.3 GM/DL (ref 32.2–35.5)
MCV RBC AUTO: 88.6 FL (ref 81–99)
MICROALBUMIN UR-MCNC: 2.76 MG/DL
MICROALBUMIN/CREAT RATIO PNL UR: 81 MG/G (ref 0–30)
MONOCYTES ABSOLUTE: 0.5 THOU/MM3 (ref 0.4–1.3)
MONOCYTES NFR BLD AUTO: 7.2 %
NEUTROPHILS ABSOLUTE: 4.5 THOU/MM3 (ref 1.8–7.7)
NEUTROPHILS NFR BLD AUTO: 65.8 %
NRBC BLD AUTO-RTO: 0 /100 WBC
PLATELET # BLD AUTO: 255 THOU/MM3 (ref 130–400)
PMV BLD AUTO: 9.9 FL (ref 9.4–12.4)
PROT UR-MCNC: 6.3 MG/DL
PROT/CREAT 24H UR: 0.18 MG/G{CREAT}
RBC # BLD AUTO: 5.6 MILL/MM3 (ref 4.2–5.4)
WBC # BLD AUTO: 6.8 THOU/MM3 (ref 4.8–10.8)

## 2025-03-19 ENCOUNTER — TELEPHONE (OUTPATIENT)
Dept: FAMILY MEDICINE CLINIC | Age: 30
End: 2025-03-19

## 2025-03-19 RX ORDER — CITALOPRAM HYDROBROMIDE 20 MG/1
20 TABLET ORAL DAILY
Qty: 30 TABLET | Refills: 3 | Status: SHIPPED | OUTPATIENT
Start: 2025-03-19

## 2025-03-19 NOTE — TELEPHONE ENCOUNTER
Pt informed of results and labs to be completed . Pt voiced understanding with no further questions at this time.

## 2025-03-19 NOTE — TELEPHONE ENCOUNTER
Carlos Rocha,   P [facility] Clinical Staff    Please let pt know that urine protein is stable.  Hemoglobin level is improving. Want to repeat again in 4 wks. Non-fasting.    Let me know if questions, thanks!

## 2025-03-19 NOTE — TELEPHONE ENCOUNTER
Recent Visits  Date Type Provider Dept   02/11/25 Office Visit Carlos Rocha, DO [facility]   Showing recent visits within past 540 days with a meds authorizing provider and meeting all other requirements  Future Appointments  Date Type Provider Dept   03/25/25 Appointment Carlos Rocha, DO [facility]   Showing future appointments within next 150 days with a meds authorizing provider and meeting all other requirements

## 2025-03-19 NOTE — TELEPHONE ENCOUNTER
Left message on answering machine. Requested pt to call back at [phone], at their earliest convenience.

## 2025-03-24 NOTE — PROGRESS NOTES
Chief Complaint   Patient presents with    Follow-up     Issues noted below     History obtained from the patient.    SUBJECTIVE:  Tamara Nuñez is a 29 y.o. female that presents today for     -Depression/Anxiety:  We started pt on Celexa last visit  Working well  Hasn't needed much prn Atarax  Happy with results thus far  CP/flutters have improved.   No SI/HI      -Obesity:  Working on diet changes/wt loss    Wt Readings from Last 3 Encounters:   03/25/25 122.1 kg (269 lb 3.2 oz)   02/11/25 120.9 kg (266 lb 9.6 oz)   02/23/24 122.5 kg (270 lb)       -Asthma:  Not needing alb much at all  No cough, wheezing or SOB      -HTN/Microalbumin:    HPI:  BP's remain <140/90  Con't to have persistent microalbumin, but stable  nephro started pt on lisinopril back in April 2021  Pt weaned off several years ago  BP's have been <140/90    Taking meds as prescribed ?: yes  Tolerating well ?: yes  Side Effects ?: denies  BP at home ?: <140/90 typically.  Working on TLCS ?: yes  Chest Pain/SOB/Palpitations? denies    BP Readings from Last 3 Encounters:   03/25/25 128/76   02/11/25 130/78   02/23/24 131/79       -HLD:  Noted on labs  Working on lifestyle changes      -Polycythemia:  Mild elevated Hb/HCT in labs in March 2025  Labs are improving  Denies fevers, chills, night sweats or wt loss      Age/Gender Health Maintenance    Lipid -   Lab Results   Component Value Date    CHOL 253 (H) 02/18/2025    CHOL 217 (H) 09/29/2020    CHOL 214 (H) 12/13/2018     Lab Results   Component Value Date    TRIG 83 02/18/2025    TRIG 120 09/29/2020    TRIG 103 12/13/2018     Lab Results   Component Value Date    HDL 63 02/18/2025    HDL 64 09/29/2020    HDL 56 12/13/2018     Lab Results   Component Value Date     02/18/2025     09/29/2020     12/13/2018       DM Screen -   Lab Results   Component Value Date/Time    GLUCOSE 87 02/18/2025 01:20 PM    GLUCOSE 97 10/19/2011 03:15 PM       Colon Cancer Screening - NEG OCT

## 2025-03-25 ENCOUNTER — OFFICE VISIT (OUTPATIENT)
Dept: FAMILY MEDICINE CLINIC | Age: 30
End: 2025-03-25
Payer: COMMERCIAL

## 2025-03-25 VITALS
HEIGHT: 66 IN | SYSTOLIC BLOOD PRESSURE: 128 MMHG | HEART RATE: 81 BPM | DIASTOLIC BLOOD PRESSURE: 76 MMHG | WEIGHT: 269.2 LBS | TEMPERATURE: 97.7 F | BODY MASS INDEX: 43.26 KG/M2 | OXYGEN SATURATION: 98 % | RESPIRATION RATE: 16 BRPM

## 2025-03-25 DIAGNOSIS — I10 HYPERTENSION, ESSENTIAL: Chronic | ICD-10-CM

## 2025-03-25 DIAGNOSIS — J45.20 MILD INTERMITTENT ASTHMA WITHOUT COMPLICATION: Chronic | ICD-10-CM

## 2025-03-25 DIAGNOSIS — E78.5 DYSLIPIDEMIA: ICD-10-CM

## 2025-03-25 DIAGNOSIS — R80.9 MICROALBUMINURIA: ICD-10-CM

## 2025-03-25 DIAGNOSIS — D75.1 POLYCYTHEMIA: ICD-10-CM

## 2025-03-25 DIAGNOSIS — F41.8 ANXIETY WITH DEPRESSION: Primary | Chronic | ICD-10-CM

## 2025-03-25 DIAGNOSIS — E66.9 OBESITY (BMI 30-39.9): Chronic | ICD-10-CM

## 2025-03-25 PROCEDURE — 99214 OFFICE O/P EST MOD 30 MIN: CPT | Performed by: FAMILY MEDICINE

## 2025-03-25 PROCEDURE — 3074F SYST BP LT 130 MM HG: CPT | Performed by: FAMILY MEDICINE

## 2025-03-25 PROCEDURE — 3078F DIAST BP <80 MM HG: CPT | Performed by: FAMILY MEDICINE

## 2025-03-25 RX ORDER — CITALOPRAM HYDROBROMIDE 20 MG/1
20 TABLET ORAL DAILY
Qty: 90 TABLET | Refills: 3 | Status: SHIPPED | OUTPATIENT
Start: 2025-03-25

## 2025-04-16 DIAGNOSIS — F41.8 ANXIETY WITH DEPRESSION: Chronic | ICD-10-CM

## 2025-04-17 RX ORDER — CITALOPRAM HYDROBROMIDE 20 MG/1
20 TABLET ORAL DAILY
Qty: 90 TABLET | Refills: 3 | Status: SHIPPED | OUTPATIENT
Start: 2025-04-17

## 2025-04-17 NOTE — TELEPHONE ENCOUNTER
Recent Visits  Date Type Provider Dept   03/25/25 Office Visit Carlos Rocha, DO [facility]   02/11/25 Office Visit Carlos Rocha, DO [facility]   Showing recent visits within past 540 days with a meds authorizing provider and meeting all other requirements  Future Appointments  No visits were found meeting these conditions.  Showing future appointments within next 150 days with a meds authorizing provider and meeting all other requirements

## 2025-04-29 ENCOUNTER — TELEPHONE (OUTPATIENT)
Dept: FAMILY MEDICINE CLINIC | Age: 30
End: 2025-04-29

## 2025-04-29 NOTE — TELEPHONE ENCOUNTER
Left detailed message about her lab work. Okay per HIPAA. Requested call back at [phone]  if they have any further questions.

## 2025-04-29 NOTE — TELEPHONE ENCOUNTER
Per your last note pt was too repeat her labs mid April and follow up based on results. As it past the expected time is there a date you would like pt to have them done by?

## 2025-04-29 NOTE — TELEPHONE ENCOUNTER
[facility] Clinical Staff (supporting Carlos Rocha DO) (Selected Message)        4/29/25  1:18 PM  Is there any way I can do my blood work this coming month? I don’t have time this week with my schedule.

## 2025-04-29 NOTE — TELEPHONE ENCOUNTER
She can do them as soon as she has time and is able.   Labs are good for a year, obviously, would like them done in the next wk or 2

## 2025-04-30 ENCOUNTER — LAB (OUTPATIENT)
Dept: LAB | Age: 30
End: 2025-04-30

## 2025-05-13 LAB — CYTOLOGY THIN PREP PAP: NORMAL

## 2025-05-16 ENCOUNTER — OFFICE VISIT (OUTPATIENT)
Dept: FAMILY MEDICINE CLINIC | Age: 30
End: 2025-05-16
Payer: COMMERCIAL

## 2025-05-16 VITALS
DIASTOLIC BLOOD PRESSURE: 78 MMHG | RESPIRATION RATE: 14 BRPM | HEIGHT: 65 IN | TEMPERATURE: 97.1 F | WEIGHT: 268.4 LBS | OXYGEN SATURATION: 97 % | BODY MASS INDEX: 44.72 KG/M2 | SYSTOLIC BLOOD PRESSURE: 122 MMHG | HEART RATE: 82 BPM

## 2025-05-16 DIAGNOSIS — R05.9 COUGH, UNSPECIFIED TYPE: Primary | ICD-10-CM

## 2025-05-16 DIAGNOSIS — H92.09 OTALGIA, UNSPECIFIED LATERALITY: ICD-10-CM

## 2025-05-16 DIAGNOSIS — J32.9 RHINOSINUSITIS: ICD-10-CM

## 2025-05-16 LAB
INFLUENZA A ANTIBODY: NEGATIVE
INFLUENZA B ANTIBODY: NEGATIVE
Lab: NORMAL
QC PASS/FAIL: NORMAL
SARS-COV-2 RDRP RESP QL NAA+PROBE: NEGATIVE

## 2025-05-16 PROCEDURE — 3078F DIAST BP <80 MM HG: CPT | Performed by: NURSE PRACTITIONER

## 2025-05-16 PROCEDURE — 3074F SYST BP LT 130 MM HG: CPT | Performed by: NURSE PRACTITIONER

## 2025-05-16 PROCEDURE — 87635 SARS-COV-2 COVID-19 AMP PRB: CPT | Performed by: NURSE PRACTITIONER

## 2025-05-16 PROCEDURE — 99214 OFFICE O/P EST MOD 30 MIN: CPT | Performed by: NURSE PRACTITIONER

## 2025-05-16 PROCEDURE — 87804 INFLUENZA ASSAY W/OPTIC: CPT | Performed by: NURSE PRACTITIONER

## 2025-05-16 NOTE — PROGRESS NOTES
appear to be intact.        ASSESSMENT & PLAN  Tamara was seen today for cough, congestion, ear pain and headache.    Diagnoses and all orders for this visit:    Cough, unspecified type  -     POCT COVID-19 Rapid, NAAT  -     POCT Influenza A/B    Otalgia, unspecified laterality  -     POCT COVID-19 Rapid, NAAT  -     POCT Influenza A/B    Rhinosinusitis  -     amoxicillin-clavulanate (AUGMENTIN) 875-125 MG per tablet; Take 1 tablet by mouth 2 times daily for 7 days  COVID and flu negative today  Start antibiotic  And continue current antihistamine, and sudafed.  Fu If no better        No follow-ups on file.     -Start above treatments  -Patient advised to contact our office immediately if symptoms worsen or persist  -Patient counseled on conservative care including fluids, rest and OTC meds      I have reviewed this patient's history, habits, and medication list and have updated the chart where appropriate.

## 2025-05-28 ENCOUNTER — OFFICE VISIT (OUTPATIENT)
Dept: FAMILY MEDICINE CLINIC | Age: 30
End: 2025-05-28
Payer: COMMERCIAL

## 2025-05-28 VITALS
DIASTOLIC BLOOD PRESSURE: 84 MMHG | BODY MASS INDEX: 45.55 KG/M2 | HEIGHT: 65 IN | OXYGEN SATURATION: 97 % | WEIGHT: 273.4 LBS | SYSTOLIC BLOOD PRESSURE: 118 MMHG | HEART RATE: 68 BPM | TEMPERATURE: 96.8 F | RESPIRATION RATE: 12 BRPM

## 2025-05-28 DIAGNOSIS — J40 SINOBRONCHITIS: Primary | ICD-10-CM

## 2025-05-28 DIAGNOSIS — J32.9 SINOBRONCHITIS: Primary | ICD-10-CM

## 2025-05-28 DIAGNOSIS — H69.93 DYSFUNCTION OF BOTH EUSTACHIAN TUBES: ICD-10-CM

## 2025-05-28 PROCEDURE — 99213 OFFICE O/P EST LOW 20 MIN: CPT | Performed by: NURSE PRACTITIONER

## 2025-05-28 PROCEDURE — 3074F SYST BP LT 130 MM HG: CPT | Performed by: NURSE PRACTITIONER

## 2025-05-28 PROCEDURE — 3079F DIAST BP 80-89 MM HG: CPT | Performed by: NURSE PRACTITIONER

## 2025-05-28 RX ORDER — DOXYCYCLINE HYCLATE 100 MG
100 TABLET ORAL 2 TIMES DAILY
Qty: 20 TABLET | Refills: 0 | Status: SHIPPED | OUTPATIENT
Start: 2025-05-28 | End: 2025-06-07

## 2025-05-28 RX ORDER — PREDNISONE 20 MG/1
40 TABLET ORAL DAILY
Qty: 10 TABLET | Refills: 0 | Status: SHIPPED | OUTPATIENT
Start: 2025-05-28 | End: 2025-06-02

## 2025-05-28 RX ORDER — LORATADINE PSEUDOEPHEDRINE SULFATE 10; 240 MG/1; MG/1
1 TABLET, EXTENDED RELEASE ORAL DAILY
Qty: 30 TABLET | Refills: 0 | Status: SHIPPED | OUTPATIENT
Start: 2025-05-28

## 2025-05-28 NOTE — PROGRESS NOTES
the Last Year: Patient declined     Ran Out of Food in the Last Year: Patient declined   Transportation Needs: Unknown (2/10/2025)    PRAPARE - Transportation     Lack of Transportation (Medical): Patient declined     Lack of Transportation (Non-Medical): No   Physical Activity: Not on file   Stress: Not on file   Social Connections: Not on file   Intimate Partner Violence: Not on file   Housing Stability: Low Risk  (2/10/2025)    Housing Stability Vital Sign     Unable to Pay for Housing in the Last Year: No     Number of Times Moved in the Last Year: 0     Homeless in the Last Year: No       Family History   Problem Relation Age of Onset    Diabetes Mother     High Blood Pressure Mother     Allergy (Severe) Mother     Obesity Mother     High Blood Pressure Father     High Cholesterol Father     Obesity Father     Breast Cancer Maternal Grandmother     Alcohol Abuse Paternal Uncle         Recovering    Asthma Sister     Early Death Maternal Cousin     Heart Attack Maternal Cousin     Colon Cancer Neg Hx            OBJECTIVE:  /84   Pulse 68   Temp 96.8 °F (36 °C) (Temporal)   Resp 12   Ht 1.651 m (5' 5\")   Wt 124 kg (273 lb 6.4 oz)   LMP 05/04/2025   SpO2 97%   BMI 45.50 kg/m²   General appearance: alert, well appearing, and in no distress.  ENT exam reveals - ENT exam normal, no neck nodes or sinus tenderness.   CVS exam: normal rate, regular rhythm, normal S1, S2, no murmurs, rubs, clicks or gallops.  Chest:clear to auscultation, no wheezes, rales or rhonchi, symmetric air entry.   Abdominal exam: soft, nontender, nondistended, no masses or organomegaly.  Extremities:  No clubbing, cyanosis or edema  Skin exam - normal coloration and turgor, no rashes, no suspicious skin lesions noted.  Psych -  Affect appropriate.  Thought process is normal without evidence of depression or psychosis.    Good insight and appropriae interaction.  Cognition and memory appear to be intact.        ASSESSMENT &

## 2025-08-08 DIAGNOSIS — J45.20 MILD INTERMITTENT ASTHMA WITHOUT COMPLICATION: ICD-10-CM

## 2025-08-08 RX ORDER — ALBUTEROL SULFATE 90 UG/1
2 INHALANT RESPIRATORY (INHALATION) EVERY 4 HOURS PRN
Qty: 2 EACH | Refills: 3 | Status: SHIPPED | OUTPATIENT
Start: 2025-08-08